# Patient Record
Sex: FEMALE | Race: BLACK OR AFRICAN AMERICAN | NOT HISPANIC OR LATINO | Employment: OTHER | URBAN - METROPOLITAN AREA
[De-identification: names, ages, dates, MRNs, and addresses within clinical notes are randomized per-mention and may not be internally consistent; named-entity substitution may affect disease eponyms.]

---

## 2017-09-30 DIAGNOSIS — H40.1133 PRIMARY OPEN ANGLE GLAUCOMA OF BOTH EYES, SEVERE STAGE: ICD-10-CM

## 2017-10-04 RX ORDER — TIMOLOL 5.12 MG/ML
SOLUTION/ DROPS OPHTHALMIC
Qty: 5 ML | Refills: 12 | Status: SHIPPED | OUTPATIENT
Start: 2017-10-04 | End: 2021-01-01

## 2017-10-04 RX ORDER — BIMATOPROST 0.1 MG/ML
1 SOLUTION/ DROPS OPHTHALMIC NIGHTLY
Qty: 2.5 ML | Refills: 12 | Status: SHIPPED | OUTPATIENT
Start: 2017-10-04 | End: 2019-01-13 | Stop reason: SDUPTHER

## 2018-12-17 ENCOUNTER — TELEPHONE (OUTPATIENT)
Dept: OPHTHALMOLOGY | Facility: CLINIC | Age: 83
End: 2018-12-17

## 2018-12-17 NOTE — TELEPHONE ENCOUNTER
[facility] lumigan and betimol. Pt has not been seen since 2016 and her drops were changed at that visit with   Dr. Ling. However, pt never returned for follow up visits. Pt will get one more fill on drops until she can come back to see Dr. Ling. Ms. Esquivel will call pt to schedule an appt

## 2019-01-13 DIAGNOSIS — H40.1133 PRIMARY OPEN ANGLE GLAUCOMA OF BOTH EYES, SEVERE STAGE: ICD-10-CM

## 2019-01-14 RX ORDER — BIMATOPROST 0.1 MG/ML
1 SOLUTION/ DROPS OPHTHALMIC NIGHTLY
Qty: 2.5 ML | Refills: 0 | Status: SHIPPED | OUTPATIENT
Start: 2019-01-14 | End: 2019-03-07 | Stop reason: SDUPTHER

## 2019-02-06 ENCOUNTER — TELEPHONE (OUTPATIENT)
Dept: PRIMARY CARE CLINIC | Facility: CLINIC | Age: 84
End: 2019-02-06

## 2019-02-06 NOTE — TELEPHONE ENCOUNTER
----- Message from Nancy Castaneda sent at 2/6/2019  1:12 PM CST -----  Name of Who is Calling: Urszula (Daughter)    What is the request in detail: Pt asking to speak to nurse about getting the pts toenails clipped      Can the clinic reply by [facility]:   No       What Number to Call Back if not in [facility]: [phone]

## 2019-02-06 NOTE — TELEPHONE ENCOUNTER
Spoke with Ms. Urszula patients daughter to explained  Unfortunately pt have to be diabetics to get there nails cut. Suggest that she take her mom to a nail salon to have them trim or cut.  And also she mention that her mom was scheduled to see  march 12, 11:00  aft

## 2019-02-14 ENCOUNTER — TELEPHONE (OUTPATIENT)
Dept: OPHTHALMOLOGY | Facility: CLINIC | Age: 84
End: 2019-02-14

## 2019-02-14 NOTE — TELEPHONE ENCOUNTER
"Spoke to pt's daughter (Cheyanne) after she and Marlene Merritt were having communication problems. I listened at length as Cheyanne talked about numerous issues from her mother, her sisters living situation and other unrelated issues. After about 10 minutes, I began to try and explain to her that Dr. Ling stated that she  was not going to be able t0  Fill out any forms since her mother has not been seen in several years. Cheyanne began to yell at me and say that I was not listening to her. I advised her that "we were trying to help her and she responded "what are we, Siamese twins, what do I mean by we" I explained that we, meaning the department of Ophthalmology was trying to help her.   Cheyanne then began to advise me that I did know anything and asked for my name and Marlene's name, which I provided. Cheyanne then told me that she was tired of playing with us and that I got my 6 months of experience and that I think I know everything, she expressed that she was a nurse and that she was reporting all of us. Cheyanne then hung up the phone. Dr. Ling advised of all of the above.   "

## 2019-02-14 NOTE — TELEPHONE ENCOUNTER
----- Message from Chanda Burris sent at 2/14/2019  3:00 PM CST -----  Contact: Cheyanne (daughter)   Needs Advice    Reason for call: pt daughter needed to speak with someone about pt eye drops. Pt daughter stated pt eyes are really red, pt is having a lot of trouble with her eyes.         Communication Preference: [phone]    Additional Information:

## 2019-02-14 NOTE — TELEPHONE ENCOUNTER
Pt's daughter called trying to get a prior authorization for her mom's Betimol drops. Pt has not been seen since 11/2016 and was prescribed new drops at her last exam. However, pt's daughter stated that her mom could not use those drops b/c she had a reaction to them. We did not know this b/c pt never returned for follow up appt. In December 2018, [facility] requested refills for drops and 1 refill was given but pt needed an appt. Pt was contacted to try and schedule appt. Pt had several appts but she no showed them. I tried to explain this to pt's daughter but she was very irate I tried to explain to her that we could not fill out any forms for her without her mom being seen. Pt's daughter continued to scream at me and would not let me finish a sentence. She began questioning my age and why I would not help her. As I tried to explain, she insulted me. At this point, I told pt that I would let her speak to another tech since she did not seem to understand me. I gave the phone to Isidro and she has documented her own conversation with pt's daughter. Cheyanne continued to be irate and would not let Isidro explain the situation to her either and finally hung up on her. Finally, I called the pt back and had Dr. Ling talk to her and explain things.   Dr. Ling explained that the medication that was at [facility] was filled back in December with the condition that pt come in for follow up. They never picked it up b/c the co-pay was too expensive.  said that she could not fill out any forms without seeing the pt since it has been over a year. Dr. Ovalle stated that she could possibly change the Betimol to another drop that would be covered but she would have to see the pt first. Dr. Ling offered to see pt ASAP but pt's daughter only wanted the drops get authorized at a lower co-pay. We have not received any forms for PA and pt's daughter did not schedule an appt.

## 2019-02-14 NOTE — TELEPHONE ENCOUNTER
----- Message from Chanda Burris sent at 2/14/2019  3:00 PM CST -----  Contact: Cheyanne (daughter)   Needs Advice    Reason for call: pt daughter needed to speak with someone about pt eye drops. Pt daughter stated pt eyes are really red, pt is having a lot of trouble with her eyes.         Communication Preference: [phone]    Additional Information:

## 2019-03-06 NOTE — PROGRESS NOTES
Assessment /Plan     For exam results, see Encounter Report.    Primary open angle glaucoma of both eyes, severe stage  -     Discontinue: bimatoprost (LUMIGAN) 0.01 % Drop; Place 1 drop into both eyes every evening.  Dispense: 2.5 mL; Refill: 0  -     Discontinue: dorzolamide-timolol 2-0.5% (COSOPT) 22.3-6.8 mg/mL ophthalmic solution; Place 1 drop into both eyes 3 (three) times daily.  Dispense: 10 mL; Refill: 12  -     dorzolamide-timolol 2-0.5% (COSOPT) 22.3-6.8 mg/mL ophthalmic solution; Place 1 drop into both eyes 3 (three) times daily.  Dispense: 10 mL; Refill: 12  -     bimatoprost (LUMIGAN) 0.01 % Drop; Place 1 drop into both eyes every evening.  Dispense: 2.5 mL; Refill: 12    Glaucoma filtering bleb of both eyes    Pseudophakia of both eyes    PCO (posterior capsular opacification), bilateral    Blind in both eyes            NEW PATIENT to [facility] 7/2014    3/7/2019  Pt is here with a daughter and a grand daughter - lives with the daughter   Pt has another daughter in [place] - who called about a month ago - wanting us to re-order her eye drops and to fill out an authorization form for betimol (it is not covered by her insurance). I tried to explain that since I had not seen her in > 2 years - that I did not feel comfortable filling out authorization forms and prescriptions .  So I sent in Rx for 1 bottle each with no refills and told her I needed to see the patient. That is why patient is here today.        Glaucoma (type and duration)    X decades - old pt Dr. Dupree at [facility] - post Jocelyne in [place]   First HVF   First [facility] - 2015   First photos   First [facility] photos 2014    Treatment / Drops started   On Rx for decades            Family history    ?        Glaucoma meds    Betimol ou / lumigan ou (also S/P trabs ou)        H/O adverse rxn to glaucoma drops    ?        LASERS   yag cap od - 11/17/2016           GLAUCOMA SURGERIES    Trabs ou - Dr. Dupree at [facility]         OTHER EYE SURGERIES    PC IOL ou         CDR    0.9 ou         Tbase    ? Off gtts and pre - trabs          Tmax    ??            Ttarget    15 ou             HVF    1 test 2014 to  2014 - 24-2 ss od  - SAD / IAD // 24-2 stim V os - SAD / IAD         Gonio    +2-3 ou         CCT   542/550        OCT    1 test 2014 to 2014 - RNFL - dec. S/T/I  od // dec. S/T/I os        HRT    ? test 20? to 20? - MR - ? od // ? os        Disc photos    2014 - OIS     - Ttoday    22/29 - (? If using gtts - 2.5 yrs since last visit))   - Test done today   IOP // re-establish care // order eye drops     2) PCO ou   Mild monitor   BCVA 20/40 od and 20/200 os (2/2 adv. Glaucoma)    3) pseudophakia ou  - stable      May need to change to 10-2 in future    Pt admits to poor compliance with gtt's  Emphasized imprtance of compliance with gtts   Cont / re-start  lumigan in both eyes  Cont / re-start cosopt - dorzolamide / timolol OU - use tid - told them that it is normal to burn / sting for 1-5 minutes after installation     Ok to use AT's pre - and post cosopt eye drops (wait at least 5 minutues)     Stop Betimol and replace it with gen cosopt     VA  LOSS OS 2/2 END STAGE GLAUCOMA     Pt is Legally blind   VF testing is within 10 degrees of fixation od and VA 20/400->HM os - decreased vision likely due to Snuff out OS     Have attempted to sen pt to [facility] in past - ?? If she ever went     F/U 1 months with DFE / OCT - doubt pt can do HVF any more    If IOP too high consider alphagan P trial (likes name brands when possible) or brimonidine    If remains too high consider G6 laser od     I have seen and personally examined the patient.  I agree with the findings, assessment and plan of the resident and/or fellow.     Charleen Ling MD

## 2019-03-07 ENCOUNTER — OFFICE VISIT (OUTPATIENT)
Dept: OPHTHALMOLOGY | Facility: CLINIC | Age: 84
End: 2019-03-07
Payer: MEDICARE

## 2019-03-07 DIAGNOSIS — Z98.83 GLAUCOMA FILTERING BLEB OF BOTH EYES: ICD-10-CM

## 2019-03-07 DIAGNOSIS — Z96.1 PSEUDOPHAKIA OF BOTH EYES: ICD-10-CM

## 2019-03-07 DIAGNOSIS — H40.1133 PRIMARY OPEN ANGLE GLAUCOMA OF BOTH EYES, SEVERE STAGE: Primary | ICD-10-CM

## 2019-03-07 DIAGNOSIS — H54.3 BLIND IN BOTH EYES: ICD-10-CM

## 2019-03-07 DIAGNOSIS — H26.493 PCO (POSTERIOR CAPSULAR OPACIFICATION), BILATERAL: ICD-10-CM

## 2019-03-07 PROCEDURE — 92012 INTRM OPH EXAM EST PATIENT: CPT | Mod: S$PBB,,, | Performed by: OPHTHALMOLOGY

## 2019-03-07 PROCEDURE — 99999 PR PBB SHADOW E&M-EST. PATIENT-LVL II: CPT | Mod: PBBFAC,,, | Performed by: OPHTHALMOLOGY

## 2019-03-07 PROCEDURE — 92012 PR EYE EXAM, EST PATIENT,INTERMED: ICD-10-PCS | Mod: S$PBB,,, | Performed by: OPHTHALMOLOGY

## 2019-03-07 PROCEDURE — 99212 OFFICE O/P EST SF 10 MIN: CPT | Mod: PBBFAC | Performed by: OPHTHALMOLOGY

## 2019-03-07 PROCEDURE — 99999 PR PBB SHADOW E&M-EST. PATIENT-LVL II: ICD-10-PCS | Mod: PBBFAC,,, | Performed by: OPHTHALMOLOGY

## 2019-03-07 RX ORDER — DORZOLAMIDE HYDROCHLORIDE AND TIMOLOL MALEATE 20; 5 MG/ML; MG/ML
1 SOLUTION/ DROPS OPHTHALMIC 3 TIMES DAILY
Qty: 10 ML | Refills: 12 | Status: SHIPPED | OUTPATIENT
Start: 2019-03-07 | End: 2019-03-07 | Stop reason: SDUPTHER

## 2019-03-07 RX ORDER — DORZOLAMIDE HYDROCHLORIDE AND TIMOLOL MALEATE 20; 5 MG/ML; MG/ML
1 SOLUTION/ DROPS OPHTHALMIC 3 TIMES DAILY
Qty: 10 ML | Refills: 12 | Status: SHIPPED | OUTPATIENT
Start: 2019-03-07 | End: 2019-08-20 | Stop reason: ALTCHOICE

## 2019-03-08 ENCOUNTER — TELEPHONE (OUTPATIENT)
Dept: PODIATRY | Facility: CLINIC | Age: 84
End: 2019-03-08

## 2019-03-08 NOTE — TELEPHONE ENCOUNTER
I have attempted to contact this patient by phone to remained patient of appointment scheduled for 3-12-19.

## 2019-08-06 RX ORDER — TIMOLOL 5.12 MG/ML
SOLUTION/ DROPS OPHTHALMIC
Qty: 5 ML | Refills: 12 | OUTPATIENT
Start: 2019-08-06

## 2019-08-20 ENCOUNTER — TELEPHONE (OUTPATIENT)
Dept: OPHTHALMOLOGY | Facility: CLINIC | Age: 84
End: 2019-08-20

## 2019-08-20 DIAGNOSIS — H40.1133 PRIMARY OPEN ANGLE GLAUCOMA (POAG) OF BOTH EYES, SEVERE STAGE: Primary | ICD-10-CM

## 2019-08-20 RX ORDER — BRIMONIDINE TARTRATE AND TIMOLOL MALEATE 2; 5 MG/ML; MG/ML
1 SOLUTION OPHTHALMIC 2 TIMES DAILY
COMMUNITY
End: 2019-08-20 | Stop reason: SDUPTHER

## 2019-08-20 RX ORDER — BRIMONIDINE TARTRATE AND TIMOLOL MALEATE 2; 5 MG/ML; MG/ML
1 SOLUTION OPHTHALMIC 2 TIMES DAILY
Qty: 5 ML | Refills: 6 | Status: SHIPPED | OUTPATIENT
Start: 2019-08-20 | End: 2020-04-03

## 2019-08-20 NOTE — TELEPHONE ENCOUNTER
Pt's daughter, Cheyanne, states that pt c/o Cosopt burning eyes too much to the point that pt refuses to have eye drops put in her eyes. Pt was seen in March and came in with her daughter, Urszula, who lives here in [place]. Dr. Ling discontinued pt's Betimol and put pt on generic Cosopt. Dr. Ling explained to them that the drop will burn/sting for a few minutes but that compliance was very important. Pt is now in [place] for a bit with her other daughter.  I tried explaining to Cheyanne why  switched drops and why she was taken off of Betimol. Pt's daughter began yelling and getting very aggressive. I have had a previous incident involving this daughter. I told pt that I will not continue to have a discussion with her and will have Dr. Ling call her back. I have called pt's daughter Urszula to see if pt was having any problems with drops since starting them and explained what was going on. They said that pt had a little burning sensation but it was not too bad. Dr. Ling spoke to the daughter in [place] and will try switching pt to Combigan. Rx sent to pharmacy.

## 2019-08-20 NOTE — TELEPHONE ENCOUNTER
----- Message from Estella Baxter sent at 8/20/2019 10:26 AM CDT -----  Contact: Daughter  Daughter is calling because the medication she uses causes eye burning; Bitimole.  She is asked Staff return the call to discuss and alternative.    She can be reached at [phone].    Thank you.

## 2019-08-27 DIAGNOSIS — H40.1133 PRIMARY OPEN ANGLE GLAUCOMA OF BOTH EYES, SEVERE STAGE: ICD-10-CM

## 2019-10-07 ENCOUNTER — TELEPHONE (OUTPATIENT)
Dept: OPHTHALMOLOGY | Facility: CLINIC | Age: 84
End: 2019-10-07

## 2020-03-15 DIAGNOSIS — H40.1133 PRIMARY OPEN ANGLE GLAUCOMA OF BOTH EYES, SEVERE STAGE: ICD-10-CM

## 2020-03-16 RX ORDER — BIMATOPROST 0.1 MG/ML
SOLUTION/ DROPS OPHTHALMIC
Qty: 7.5 ML | Refills: 3 | Status: SHIPPED | OUTPATIENT
Start: 2020-03-16 | End: 2020-12-10

## 2020-04-03 DIAGNOSIS — H40.1133 PRIMARY OPEN ANGLE GLAUCOMA (POAG) OF BOTH EYES, SEVERE STAGE: ICD-10-CM

## 2020-04-03 RX ORDER — BRIMONIDINE TARTRATE, TIMOLOL MALEATE 2; 5 MG/ML; MG/ML
SOLUTION/ DROPS OPHTHALMIC
Qty: 5 ML | Refills: 6 | Status: SHIPPED | OUTPATIENT
Start: 2020-04-03 | End: 2020-12-17

## 2020-10-01 ENCOUNTER — PATIENT MESSAGE (OUTPATIENT)
Dept: OTHER | Facility: OTHER | Age: 85
End: 2020-10-01

## 2020-12-11 ENCOUNTER — PATIENT MESSAGE (OUTPATIENT)
Dept: OTHER | Facility: OTHER | Age: 85
End: 2020-12-11

## 2021-01-01 ENCOUNTER — TELEPHONE (OUTPATIENT)
Dept: PRIMARY CARE CLINIC | Facility: CLINIC | Age: 86
End: 2021-01-01

## 2021-01-01 ENCOUNTER — CARE AT HOME (OUTPATIENT)
Dept: HOME HEALTH SERVICES | Facility: CLINIC | Age: 86
End: 2021-01-01
Payer: MEDICARE

## 2021-01-01 ENCOUNTER — EXTERNAL HOME HEALTH (OUTPATIENT)
Dept: HOME HEALTH SERVICES | Facility: HOSPITAL | Age: 86
End: 2021-01-01
Payer: MEDICARE

## 2021-01-01 ENCOUNTER — TELEPHONE (OUTPATIENT)
Dept: EMERGENCY MEDICINE | Facility: OTHER | Age: 86
End: 2021-01-01

## 2021-01-01 ENCOUNTER — TELEPHONE (OUTPATIENT)
Dept: PRIMARY CARE CLINIC | Facility: CLINIC | Age: 86
End: 2021-01-01
Payer: MEDICARE

## 2021-01-01 ENCOUNTER — OFFICE VISIT (OUTPATIENT)
Dept: INTERNAL MEDICINE | Facility: CLINIC | Age: 86
End: 2021-01-01
Payer: MEDICARE

## 2021-01-01 ENCOUNTER — OFFICE VISIT (OUTPATIENT)
Dept: PRIMARY CARE CLINIC | Facility: CLINIC | Age: 86
End: 2021-01-01
Payer: MEDICARE

## 2021-01-01 ENCOUNTER — HOSPITAL ENCOUNTER (OUTPATIENT)
Facility: OTHER | Age: 86
Discharge: HOME | End: 2021-08-29
Attending: EMERGENCY MEDICINE | Admitting: EMERGENCY MEDICINE
Payer: MEDICARE

## 2021-01-01 ENCOUNTER — TELEPHONE (OUTPATIENT)
Dept: INTERNAL MEDICINE | Facility: CLINIC | Age: 86
End: 2021-01-01

## 2021-01-01 ENCOUNTER — PATIENT MESSAGE (OUTPATIENT)
Dept: RESEARCH | Facility: HOSPITAL | Age: 86
End: 2021-01-01

## 2021-01-01 ENCOUNTER — OUTPATIENT CASE MANAGEMENT (OUTPATIENT)
Dept: ADMINISTRATIVE | Facility: OTHER | Age: 86
End: 2021-01-01

## 2021-01-01 ENCOUNTER — LAB VISIT (OUTPATIENT)
Dept: LAB | Facility: HOSPITAL | Age: 86
End: 2021-01-01
Attending: INTERNAL MEDICINE
Payer: MEDICARE

## 2021-01-01 ENCOUNTER — IMMUNIZATION (OUTPATIENT)
Dept: PRIMARY CARE CLINIC | Facility: CLINIC | Age: 86
End: 2021-01-01
Attending: EMERGENCY MEDICINE

## 2021-01-01 ENCOUNTER — HOSPITAL ENCOUNTER (EMERGENCY)
Facility: OTHER | Age: 86
Discharge: HOME | End: 2021-08-04
Attending: EMERGENCY MEDICINE
Payer: MEDICARE

## 2021-01-01 ENCOUNTER — TELEPHONE (OUTPATIENT)
Dept: ADMINISTRATIVE | Facility: OTHER | Age: 86
End: 2021-01-01

## 2021-01-01 ENCOUNTER — HOSPITAL ENCOUNTER (OUTPATIENT)
Dept: RADIOLOGY | Facility: HOSPITAL | Age: 86
Discharge: HOME | End: 2021-11-30
Attending: INTERNAL MEDICINE
Payer: MEDICARE

## 2021-01-01 VITALS
RESPIRATION RATE: 16 BRPM | OXYGEN SATURATION: 95 % | TEMPERATURE: 98 F | SYSTOLIC BLOOD PRESSURE: 160 MMHG | HEART RATE: 59 BPM | DIASTOLIC BLOOD PRESSURE: 84 MMHG

## 2021-01-01 VITALS
OXYGEN SATURATION: 96 % | DIASTOLIC BLOOD PRESSURE: 90 MMHG | RESPIRATION RATE: 18 BRPM | HEART RATE: 69 BPM | SYSTOLIC BLOOD PRESSURE: 140 MMHG | TEMPERATURE: 98 F

## 2021-01-01 VITALS
BODY MASS INDEX: 23.57 KG/M2 | HEIGHT: 68 IN | SYSTOLIC BLOOD PRESSURE: 132 MMHG | OXYGEN SATURATION: 99 % | TEMPERATURE: 98 F | HEART RATE: 62 BPM | DIASTOLIC BLOOD PRESSURE: 80 MMHG

## 2021-01-01 VITALS
BODY MASS INDEX: 13.79 KG/M2 | HEIGHT: 68 IN | SYSTOLIC BLOOD PRESSURE: 128 MMHG | DIASTOLIC BLOOD PRESSURE: 86 MMHG | HEART RATE: 82 BPM | TEMPERATURE: 98 F | OXYGEN SATURATION: 99 %

## 2021-01-01 VITALS
OXYGEN SATURATION: 100 % | TEMPERATURE: 98 F | HEART RATE: 66 BPM | RESPIRATION RATE: 31 BRPM | DIASTOLIC BLOOD PRESSURE: 77 MMHG | SYSTOLIC BLOOD PRESSURE: 154 MMHG

## 2021-01-01 VITALS
BODY MASS INDEX: 23.57 KG/M2 | SYSTOLIC BLOOD PRESSURE: 112 MMHG | HEIGHT: 68 IN | OXYGEN SATURATION: 99 % | HEART RATE: 62 BPM | DIASTOLIC BLOOD PRESSURE: 70 MMHG

## 2021-01-01 VITALS
WEIGHT: 90.69 LBS | BODY MASS INDEX: 13.75 KG/M2 | DIASTOLIC BLOOD PRESSURE: 93 MMHG | OXYGEN SATURATION: 98 % | TEMPERATURE: 98 F | HEIGHT: 68 IN | HEART RATE: 76 BPM | RESPIRATION RATE: 17 BRPM | SYSTOLIC BLOOD PRESSURE: 179 MMHG

## 2021-01-01 VITALS
TEMPERATURE: 99 F | HEART RATE: 71 BPM | DIASTOLIC BLOOD PRESSURE: 98 MMHG | SYSTOLIC BLOOD PRESSURE: 162 MMHG | HEIGHT: 68 IN | OXYGEN SATURATION: 97 % | BODY MASS INDEX: 13.79 KG/M2

## 2021-01-01 DIAGNOSIS — H40.1133 PRIMARY OPEN ANGLE GLAUCOMA OF BOTH EYES, SEVERE STAGE: ICD-10-CM

## 2021-01-01 DIAGNOSIS — T76.01XA: ICD-10-CM

## 2021-01-01 DIAGNOSIS — H40.1133 PRIMARY OPEN ANGLE GLAUCOMA (POAG) OF BOTH EYES, SEVERE STAGE: ICD-10-CM

## 2021-01-01 DIAGNOSIS — R10.30 LOWER ABDOMINAL PAIN: Primary | ICD-10-CM

## 2021-01-01 DIAGNOSIS — M24.569 CONTRACTURE OF LOWER LEG JOINT: ICD-10-CM

## 2021-01-01 DIAGNOSIS — Z74.01 BEDBOUND: ICD-10-CM

## 2021-01-01 DIAGNOSIS — R41.3 MEMORY DEFICIT: ICD-10-CM

## 2021-01-01 DIAGNOSIS — R41.82 AMS (ALTERED MENTAL STATUS): ICD-10-CM

## 2021-01-01 DIAGNOSIS — L89.220: ICD-10-CM

## 2021-01-01 DIAGNOSIS — K59.01 SLOW TRANSIT CONSTIPATION: ICD-10-CM

## 2021-01-01 DIAGNOSIS — E86.0 DEHYDRATION: ICD-10-CM

## 2021-01-01 DIAGNOSIS — H54.3 IMPAIRED VISION IN BOTH EYES: ICD-10-CM

## 2021-01-01 DIAGNOSIS — Z23 FLU VACCINE NEED: ICD-10-CM

## 2021-01-01 DIAGNOSIS — L89.152 PRESSURE INJURY OF SACRAL REGION, STAGE 2: ICD-10-CM

## 2021-01-01 DIAGNOSIS — F44.4 FUNCTIONAL PARAPARESIS: Primary | ICD-10-CM

## 2021-01-01 DIAGNOSIS — K59.00 CONSTIPATION, UNSPECIFIED CONSTIPATION TYPE: ICD-10-CM

## 2021-01-01 DIAGNOSIS — I10 ESSENTIAL HYPERTENSION: ICD-10-CM

## 2021-01-01 DIAGNOSIS — N30.01 ACUTE CYSTITIS WITH HEMATURIA: ICD-10-CM

## 2021-01-01 DIAGNOSIS — Z00.00 HEALTHCARE MAINTENANCE: ICD-10-CM

## 2021-01-01 DIAGNOSIS — R64 CACHECTIC: ICD-10-CM

## 2021-01-01 DIAGNOSIS — M62.421 CONTRACTURE OF MUSCLE OF BOTH UPPER ARMS: ICD-10-CM

## 2021-01-01 DIAGNOSIS — E77.8 HYPOPROTEINEMIA: ICD-10-CM

## 2021-01-01 DIAGNOSIS — M24.561 CONTRACTURES OF BOTH KNEES: ICD-10-CM

## 2021-01-01 DIAGNOSIS — R53.1 GENERALIZED WEAKNESS: ICD-10-CM

## 2021-01-01 DIAGNOSIS — F03.90 DEMENTIA WITHOUT BEHAVIORAL DISTURBANCE, UNSPECIFIED DEMENTIA TYPE: ICD-10-CM

## 2021-01-01 DIAGNOSIS — I25.118 CORONARY ARTERY DISEASE OF NATIVE ARTERY OF NATIVE HEART WITH STABLE ANGINA PECTORIS: ICD-10-CM

## 2021-01-01 DIAGNOSIS — F44.4 FUNCTIONAL PARAPARESIS: ICD-10-CM

## 2021-01-01 DIAGNOSIS — R53.81 DEBILITY: Primary | ICD-10-CM

## 2021-01-01 DIAGNOSIS — R62.7 FAILURE TO THRIVE IN ADULT: Primary | ICD-10-CM

## 2021-01-01 DIAGNOSIS — I10 PRIMARY HYPERTENSION: ICD-10-CM

## 2021-01-01 DIAGNOSIS — Z51.5 PALLIATIVE CARE ENCOUNTER: Primary | ICD-10-CM

## 2021-01-01 DIAGNOSIS — R73.9 HYPERGLYCEMIA: ICD-10-CM

## 2021-01-01 DIAGNOSIS — R80.9 PROTEINURIA, UNSPECIFIED TYPE: ICD-10-CM

## 2021-01-01 DIAGNOSIS — R54 AGE-RELATED PHYSICAL DEBILITY: ICD-10-CM

## 2021-01-01 DIAGNOSIS — M25.529 ELBOW PAIN: ICD-10-CM

## 2021-01-01 DIAGNOSIS — R53.81 PHYSICAL DECONDITIONING: ICD-10-CM

## 2021-01-01 DIAGNOSIS — M62.422 CONTRACTURE OF MUSCLE OF BOTH UPPER ARMS: ICD-10-CM

## 2021-01-01 DIAGNOSIS — R41.82 ALTERED MENTAL STATUS, UNSPECIFIED ALTERED MENTAL STATUS TYPE: Primary | ICD-10-CM

## 2021-01-01 DIAGNOSIS — Z71.89 COUNSELING REGARDING ADVANCE CARE PLANNING AND GOALS OF CARE: ICD-10-CM

## 2021-01-01 DIAGNOSIS — R53.81 PHYSICAL DEBILITY: ICD-10-CM

## 2021-01-01 DIAGNOSIS — K59.00 CONSTIPATION, UNSPECIFIED CONSTIPATION TYPE: Primary | ICD-10-CM

## 2021-01-01 DIAGNOSIS — Z23 NEED FOR VACCINATION: Primary | ICD-10-CM

## 2021-01-01 DIAGNOSIS — R55 SYNCOPE: ICD-10-CM

## 2021-01-01 DIAGNOSIS — R64 CACHEXIA: ICD-10-CM

## 2021-01-01 DIAGNOSIS — E43 SEVERE PROTEIN-ENERGY MALNUTRITION: ICD-10-CM

## 2021-01-01 DIAGNOSIS — R55 SYNCOPE, UNSPECIFIED SYNCOPE TYPE: ICD-10-CM

## 2021-01-01 DIAGNOSIS — M62.461 CONTRACTURE OF MUSCLES OF BOTH LOWER EXTREMITIES: ICD-10-CM

## 2021-01-01 DIAGNOSIS — M62.462 CONTRACTURE OF MUSCLES OF BOTH LOWER EXTREMITIES: ICD-10-CM

## 2021-01-01 DIAGNOSIS — R31.9 HEMATURIA, UNSPECIFIED TYPE: ICD-10-CM

## 2021-01-01 DIAGNOSIS — R64 CACHEXIA: Primary | ICD-10-CM

## 2021-01-01 DIAGNOSIS — R52 PAIN: ICD-10-CM

## 2021-01-01 DIAGNOSIS — I77.1 TORTUOUS AORTA: ICD-10-CM

## 2021-01-01 DIAGNOSIS — M24.562 CONTRACTURES OF BOTH KNEES: ICD-10-CM

## 2021-01-01 LAB
ALBUMIN SERPL BCP-MCNC: 3 G/DL (ref 3.5–5.2)
ALBUMIN SERPL BCP-MCNC: 3 G/DL (ref 3.5–5.2)
ALP SERPL-CCNC: 75 U/L (ref 55–135)
ALP SERPL-CCNC: 90 U/L (ref 55–135)
ALT SERPL W/O P-5'-P-CCNC: 16 U/L (ref 10–44)
ALT SERPL W/O P-5'-P-CCNC: 19 U/L (ref 10–44)
ANION GAP SERPL CALC-SCNC: 11 MMOL/L (ref 8–16)
ANION GAP SERPL CALC-SCNC: 12 MMOL/L (ref 8–16)
ANION GAP SERPL CALC-SCNC: 7 MMOL/L (ref 8–16)
AST SERPL-CCNC: 24 U/L (ref 10–40)
AST SERPL-CCNC: 34 U/L (ref 10–40)
BACTERIA #/AREA URNS HPF: ABNORMAL /HPF
BACTERIA BLD CULT: NORMAL
BACTERIA BLD CULT: NORMAL
BASOPHILS # BLD AUTO: 0.03 K/UL (ref 0–0.2)
BASOPHILS # BLD AUTO: 0.04 K/UL (ref 0–0.2)
BASOPHILS NFR BLD: 0.5 % (ref 0–1.9)
BASOPHILS NFR BLD: 0.9 % (ref 0–1.9)
BILIRUB SERPL-MCNC: 0.3 MG/DL (ref 0.1–1)
BILIRUB SERPL-MCNC: 0.4 MG/DL (ref 0.1–1)
BILIRUB UR QL STRIP: NEGATIVE
BILIRUB UR QL STRIP: NEGATIVE
BNP SERPL-MCNC: 156 PG/ML (ref 0–99)
BUN SERPL-MCNC: 16 MG/DL (ref 8–23)
BUN SERPL-MCNC: 19 MG/DL (ref 8–23)
BUN SERPL-MCNC: 20 MG/DL (ref 8–23)
CALCIUM SERPL-MCNC: 10 MG/DL (ref 8.7–10.5)
CALCIUM SERPL-MCNC: 9.5 MG/DL (ref 8.7–10.5)
CALCIUM SERPL-MCNC: 9.5 MG/DL (ref 8.7–10.5)
CHLORIDE SERPL-SCNC: 103 MMOL/L (ref 95–110)
CHLORIDE SERPL-SCNC: 104 MMOL/L (ref 95–110)
CHLORIDE SERPL-SCNC: 104 MMOL/L (ref 95–110)
CHOLEST SERPL-MCNC: 151 MG/DL (ref 120–199)
CHOLEST/HDLC SERPL: 4 {RATIO} (ref 2–5)
CLARITY UR: ABNORMAL
CLARITY UR: CLEAR
CO2 SERPL-SCNC: 21 MMOL/L (ref 23–29)
CO2 SERPL-SCNC: 22 MMOL/L (ref 23–29)
CO2 SERPL-SCNC: 27 MMOL/L (ref 23–29)
COLOR UR: YELLOW
COLOR UR: YELLOW
CREAT SERPL-MCNC: 0.7 MG/DL (ref 0.5–1.4)
CREAT SERPL-MCNC: 0.7 MG/DL (ref 0.5–1.4)
CREAT SERPL-MCNC: 0.8 MG/DL (ref 0.5–1.4)
CTP QC/QA: YES
CTP QC/QA: YES
DIFFERENTIAL METHOD: ABNORMAL
DIFFERENTIAL METHOD: ABNORMAL
EOSINOPHIL # BLD AUTO: 0.1 K/UL (ref 0–0.5)
EOSINOPHIL # BLD AUTO: 0.1 K/UL (ref 0–0.5)
EOSINOPHIL NFR BLD: 1.7 % (ref 0–8)
EOSINOPHIL NFR BLD: 1.9 % (ref 0–8)
ERYTHROCYTE [DISTWIDTH] IN BLOOD BY AUTOMATED COUNT: 13.9 % (ref 11.5–14.5)
ERYTHROCYTE [DISTWIDTH] IN BLOOD BY AUTOMATED COUNT: 14.6 % (ref 11.5–14.5)
EST. GFR  (AFRICAN AMERICAN): >60 ML/MIN/1.73 M^2
EST. GFR  (NON AFRICAN AMERICAN): >60 ML/MIN/1.73 M^2
FOLATE SERPL-MCNC: 15.9 NG/ML (ref 4–24)
GLUCOSE SERPL-MCNC: 108 MG/DL (ref 70–110)
GLUCOSE SERPL-MCNC: 204 MG/DL (ref 70–110)
GLUCOSE SERPL-MCNC: 73 MG/DL (ref 70–110)
GLUCOSE UR QL STRIP: NEGATIVE
GLUCOSE UR QL STRIP: NEGATIVE
HCT VFR BLD AUTO: 40.8 % (ref 37–48.5)
HCT VFR BLD AUTO: 41.6 % (ref 37–48.5)
HDLC SERPL-MCNC: 38 MG/DL (ref 40–75)
HDLC SERPL: 25.2 % (ref 20–50)
HGB BLD-MCNC: 12.6 G/DL (ref 12–16)
HGB BLD-MCNC: 13.3 G/DL (ref 12–16)
HGB UR QL STRIP: ABNORMAL
HGB UR QL STRIP: NEGATIVE
HYALINE CASTS #/AREA URNS LPF: 0 /LPF
IMM GRANULOCYTES # BLD AUTO: 0.01 K/UL (ref 0–0.04)
IMM GRANULOCYTES # BLD AUTO: 0.02 K/UL (ref 0–0.04)
IMM GRANULOCYTES NFR BLD AUTO: 0.2 % (ref 0–0.5)
IMM GRANULOCYTES NFR BLD AUTO: 0.3 % (ref 0–0.5)
KETONES UR QL STRIP: ABNORMAL
KETONES UR QL STRIP: NEGATIVE
LACTATE SERPL-SCNC: 0.9 MMOL/L (ref 0.5–2.2)
LDLC SERPL CALC-MCNC: 91 MG/DL (ref 63–159)
LEUKOCYTE ESTERASE UR QL STRIP: ABNORMAL
LEUKOCYTE ESTERASE UR QL STRIP: NEGATIVE
LYMPHOCYTES # BLD AUTO: 0.7 K/UL (ref 1–4.8)
LYMPHOCYTES # BLD AUTO: 1.2 K/UL (ref 1–4.8)
LYMPHOCYTES NFR BLD: 11.5 % (ref 18–48)
LYMPHOCYTES NFR BLD: 27 % (ref 18–48)
MAGNESIUM SERPL-MCNC: 1.8 MG/DL (ref 1.6–2.6)
MAGNESIUM SERPL-MCNC: 1.9 MG/DL (ref 1.6–2.6)
MCH RBC QN AUTO: 29.2 PG (ref 27–31)
MCH RBC QN AUTO: 29.5 PG (ref 27–31)
MCHC RBC AUTO-ENTMCNC: 30.9 G/DL (ref 32–36)
MCHC RBC AUTO-ENTMCNC: 32 G/DL (ref 32–36)
MCV RBC AUTO: 92 FL (ref 82–98)
MCV RBC AUTO: 95 FL (ref 82–98)
MICROSCOPIC COMMENT: ABNORMAL
MONOCYTES # BLD AUTO: 0.3 K/UL (ref 0.3–1)
MONOCYTES # BLD AUTO: 0.4 K/UL (ref 0.3–1)
MONOCYTES NFR BLD: 6 % (ref 4–15)
MONOCYTES NFR BLD: 6.4 % (ref 4–15)
NEUTROPHILS # BLD AUTO: 2.8 K/UL (ref 1.8–7.7)
NEUTROPHILS # BLD AUTO: 4.6 K/UL (ref 1.8–7.7)
NEUTROPHILS NFR BLD: 64 % (ref 38–73)
NEUTROPHILS NFR BLD: 79.6 % (ref 38–73)
NITRITE UR QL STRIP: NEGATIVE
NITRITE UR QL STRIP: POSITIVE
NONHDLC SERPL-MCNC: 113 MG/DL
NRBC BLD-RTO: 0 /100 WBC
NRBC BLD-RTO: 0 /100 WBC
PH UR STRIP: 7 [PH] (ref 5–8)
PH UR STRIP: 8 [PH] (ref 5–8)
PHOSPHATE SERPL-MCNC: 3.2 MG/DL (ref 2.7–4.5)
PLATELET # BLD AUTO: 252 K/UL (ref 150–450)
PLATELET # BLD AUTO: 275 K/UL (ref 150–450)
PMV BLD AUTO: 9.6 FL (ref 9.2–12.9)
PMV BLD AUTO: 9.8 FL (ref 9.2–12.9)
POCT GLUCOSE: 162 MG/DL (ref 70–110)
POTASSIUM SERPL-SCNC: 3.9 MMOL/L (ref 3.5–5.1)
POTASSIUM SERPL-SCNC: 4.4 MMOL/L (ref 3.5–5.1)
POTASSIUM SERPL-SCNC: 5.4 MMOL/L (ref 3.5–5.1)
PROT SERPL-MCNC: 8.1 G/DL (ref 6–8.4)
PROT SERPL-MCNC: 8.5 G/DL (ref 6–8.4)
PROT UR QL STRIP: ABNORMAL
PROT UR QL STRIP: NEGATIVE
RBC # BLD AUTO: 4.31 M/UL (ref 4–5.4)
RBC # BLD AUTO: 4.51 M/UL (ref 4–5.4)
RBC #/AREA URNS HPF: 5 /HPF (ref 0–4)
RPR SER QL: NORMAL
SARS-COV-2 RDRP RESP QL NAA+PROBE: NEGATIVE
SARS-COV-2 RDRP RESP QL NAA+PROBE: NEGATIVE
SODIUM SERPL-SCNC: 136 MMOL/L (ref 136–145)
SODIUM SERPL-SCNC: 137 MMOL/L (ref 136–145)
SODIUM SERPL-SCNC: 138 MMOL/L (ref 136–145)
SP GR UR STRIP: 1.02 (ref 1–1.03)
SP GR UR STRIP: 1.02 (ref 1–1.03)
SQUAMOUS #/AREA URNS HPF: 3 /HPF
TRIGL SERPL-MCNC: 110 MG/DL (ref 30–150)
TROPONIN I SERPL DL<=0.01 NG/ML-MCNC: 0.01 NG/ML (ref 0–0.03)
TSH SERPL DL<=0.005 MIU/L-ACNC: 1.62 UIU/ML (ref 0.4–4)
URN SPEC COLLECT METH UR: ABNORMAL
URN SPEC COLLECT METH UR: NORMAL
UROBILINOGEN UR STRIP-ACNC: NEGATIVE EU/DL
UROBILINOGEN UR STRIP-ACNC: NEGATIVE EU/DL
VIT B12 SERPL-MCNC: 1132 PG/ML (ref 210–950)
WBC # BLD AUTO: 4.3 K/UL (ref 3.9–12.7)
WBC # BLD AUTO: 5.82 K/UL (ref 3.9–12.7)
WBC #/AREA URNS HPF: >100 /HPF (ref 0–5)

## 2021-01-01 PROCEDURE — 84484 ASSAY OF TROPONIN QUANT: CPT | Performed by: EMERGENCY MEDICINE

## 2021-01-01 PROCEDURE — 93005 ELECTROCARDIOGRAM TRACING: CPT

## 2021-01-01 PROCEDURE — 93010 ELECTROCARDIOGRAM REPORT: CPT | Mod: ,,, | Performed by: INTERNAL MEDICINE

## 2021-01-01 PROCEDURE — 74018 RADEX ABDOMEN 1 VIEW: CPT | Mod: 26,,, | Performed by: RADIOLOGY

## 2021-01-01 PROCEDURE — 99217 PR OBSERVATION CARE DISCHARGE: CPT | Mod: ,,, | Performed by: HOSPITALIST

## 2021-01-01 PROCEDURE — 80048 BASIC METABOLIC PNL TOTAL CA: CPT | Performed by: HOSPITALIST

## 2021-01-01 PROCEDURE — 99497 ADVNCD CARE PLAN 30 MIN: CPT | Mod: S$GLB,,, | Performed by: NURSE PRACTITIONER

## 2021-01-01 PROCEDURE — 87040 BLOOD CULTURE FOR BACTERIA: CPT | Mod: 59 | Performed by: PHYSICIAN ASSISTANT

## 2021-01-01 PROCEDURE — 99214 PR OFFICE/OUTPT VISIT, EST, LEVL IV, 30-39 MIN: ICD-10-PCS | Mod: 95,,, | Performed by: INTERNAL MEDICINE

## 2021-01-01 PROCEDURE — 91300 COVID-19, MRNA, LNP-S, PF, 30 MCG/0.3 ML DOSE VACCINE: CPT | Mod: S$GLB,,, | Performed by: EMERGENCY MEDICINE

## 2021-01-01 PROCEDURE — 99350 PR HOME VISIT,ESTAB PATIENT,LEVEL IV: ICD-10-PCS | Mod: S$GLB,,, | Performed by: NURSE PRACTITIONER

## 2021-01-01 PROCEDURE — 99999 PR PBB SHADOW E&M-EST. PATIENT-LVL IV: ICD-10-PCS | Mod: PBBFAC,GC,, | Performed by: STUDENT IN AN ORGANIZED HEALTH CARE EDUCATION/TRAINING PROGRAM

## 2021-01-01 PROCEDURE — 82607 VITAMIN B-12: CPT | Performed by: INTERNAL MEDICINE

## 2021-01-01 PROCEDURE — 25000003 PHARM REV CODE 250: Performed by: EMERGENCY MEDICINE

## 2021-01-01 PROCEDURE — 80053 COMPREHEN METABOLIC PANEL: CPT | Performed by: PHYSICIAN ASSISTANT

## 2021-01-01 PROCEDURE — 93010 EKG 12-LEAD: ICD-10-PCS | Mod: ,,, | Performed by: INTERNAL MEDICINE

## 2021-01-01 PROCEDURE — 91300 COVID-19, MRNA, LNP-S, PF, 30 MCG/0.3 ML DOSE VACCINE: ICD-10-PCS | Mod: S$GLB,,, | Performed by: EMERGENCY MEDICINE

## 2021-01-01 PROCEDURE — 99215 PR OFFICE/OUTPT VISIT, EST, LEVL V, 40-54 MIN: ICD-10-PCS | Mod: S$GLB,,, | Performed by: INTERNAL MEDICINE

## 2021-01-01 PROCEDURE — 82962 GLUCOSE BLOOD TEST: CPT

## 2021-01-01 PROCEDURE — G0378 HOSPITAL OBSERVATION PER HR: HCPCS

## 2021-01-01 PROCEDURE — 99214 OFFICE O/P EST MOD 30 MIN: CPT | Mod: 95,,, | Performed by: INTERNAL MEDICINE

## 2021-01-01 PROCEDURE — 81000 URINALYSIS NONAUTO W/SCOPE: CPT | Performed by: EMERGENCY MEDICINE

## 2021-01-01 PROCEDURE — 85025 COMPLETE CBC W/AUTO DIFF WBC: CPT | Performed by: EMERGENCY MEDICINE

## 2021-01-01 PROCEDURE — 83735 ASSAY OF MAGNESIUM: CPT | Performed by: PHYSICIAN ASSISTANT

## 2021-01-01 PROCEDURE — 99214 OFFICE O/P EST MOD 30 MIN: CPT | Mod: PBBFAC | Performed by: STUDENT IN AN ORGANIZED HEALTH CARE EDUCATION/TRAINING PROGRAM

## 2021-01-01 PROCEDURE — 91300 PHARM REV CODE 636 W HCPCS: CPT | Performed by: EMERGENCY MEDICINE

## 2021-01-01 PROCEDURE — 74018 XR ABDOMEN AP 1 VIEW: ICD-10-PCS | Mod: 26,,, | Performed by: RADIOLOGY

## 2021-01-01 PROCEDURE — 84443 ASSAY THYROID STIM HORMONE: CPT | Performed by: INTERNAL MEDICINE

## 2021-01-01 PROCEDURE — 63600175 PHARM REV CODE 636 W HCPCS: Performed by: EMERGENCY MEDICINE

## 2021-01-01 PROCEDURE — 99497 PR ADVNCD CARE PLAN 30 MIN: ICD-10-PCS | Mod: S$GLB,,, | Performed by: NURSE PRACTITIONER

## 2021-01-01 PROCEDURE — 96372 THER/PROPH/DIAG INJ SC/IM: CPT | Mod: 59 | Performed by: EMERGENCY MEDICINE

## 2021-01-01 PROCEDURE — 36415 COLL VENOUS BLD VENIPUNCTURE: CPT | Performed by: HOSPITALIST

## 2021-01-01 PROCEDURE — G0180 PR HOME HEALTH MD CERTIFICATION: ICD-10-PCS | Mod: ,,, | Performed by: INTERNAL MEDICINE

## 2021-01-01 PROCEDURE — 99285 EMERGENCY DEPT VISIT HI MDM: CPT | Mod: 25

## 2021-01-01 PROCEDURE — U0002 COVID-19 LAB TEST NON-CDC: HCPCS | Performed by: EMERGENCY MEDICINE

## 2021-01-01 PROCEDURE — 99203 PR OFFICE/OUTPT VISIT, NEW, LEVL III, 30-44 MIN: ICD-10-PCS | Mod: S$PBB,GC,, | Performed by: STUDENT IN AN ORGANIZED HEALTH CARE EDUCATION/TRAINING PROGRAM

## 2021-01-01 PROCEDURE — 0002A COVID-19, MRNA, LNP-S, PF, 30 MCG/0.3 ML DOSE VACCINE: ICD-10-PCS | Mod: CV19,S$GLB,, | Performed by: EMERGENCY MEDICINE

## 2021-01-01 PROCEDURE — 63600175 PHARM REV CODE 636 W HCPCS: Performed by: HOSPITALIST

## 2021-01-01 PROCEDURE — 99220 PR INITIAL OBSERVATION CARE,LEVL III: CPT | Mod: ,,, | Performed by: HOSPITALIST

## 2021-01-01 PROCEDURE — 82746 ASSAY OF FOLIC ACID SERUM: CPT | Performed by: INTERNAL MEDICINE

## 2021-01-01 PROCEDURE — 0002A COVID-19, MRNA, LNP-S, PF, 30 MCG/0.3 ML DOSE VACCINE: CPT | Mod: CV19,S$GLB,, | Performed by: EMERGENCY MEDICINE

## 2021-01-01 PROCEDURE — 36415 COLL VENOUS BLD VENIPUNCTURE: CPT | Performed by: INTERNAL MEDICINE

## 2021-01-01 PROCEDURE — 83880 ASSAY OF NATRIURETIC PEPTIDE: CPT | Performed by: EMERGENCY MEDICINE

## 2021-01-01 PROCEDURE — 25000003 PHARM REV CODE 250: Performed by: HOSPITALIST

## 2021-01-01 PROCEDURE — 99215 OFFICE O/P EST HI 40 MIN: CPT | Mod: S$GLB,,, | Performed by: INTERNAL MEDICINE

## 2021-01-01 PROCEDURE — 83605 ASSAY OF LACTIC ACID: CPT | Performed by: PHYSICIAN ASSISTANT

## 2021-01-01 PROCEDURE — 84100 ASSAY OF PHOSPHORUS: CPT | Performed by: HOSPITALIST

## 2021-01-01 PROCEDURE — 99417 PR PROLONGED SVC, OUTPT, W/WO DIRECT PT CONTACT,  EA ADDTL 15 MIN: ICD-10-PCS | Mod: S$GLB,,, | Performed by: INTERNAL MEDICINE

## 2021-01-01 PROCEDURE — 99417 PROLNG OP E/M EACH 15 MIN: CPT | Mod: S$GLB,,, | Performed by: INTERNAL MEDICINE

## 2021-01-01 PROCEDURE — 96374 THER/PROPH/DIAG INJ IV PUSH: CPT | Performed by: EMERGENCY MEDICINE

## 2021-01-01 PROCEDURE — 86592 SYPHILIS TEST NON-TREP QUAL: CPT | Performed by: INTERNAL MEDICINE

## 2021-01-01 PROCEDURE — 99350 HOME/RES VST EST HIGH MDM 60: CPT | Mod: S$GLB,,, | Performed by: NURSE PRACTITIONER

## 2021-01-01 PROCEDURE — 85025 COMPLETE CBC W/AUTO DIFF WBC: CPT | Performed by: PHYSICIAN ASSISTANT

## 2021-01-01 PROCEDURE — 74018 RADEX ABDOMEN 1 VIEW: CPT | Mod: TC

## 2021-01-01 PROCEDURE — 96361 HYDRATE IV INFUSION ADD-ON: CPT

## 2021-01-01 PROCEDURE — G0180 MD CERTIFICATION HHA PATIENT: HCPCS | Mod: ,,, | Performed by: INTERNAL MEDICINE

## 2021-01-01 PROCEDURE — 81003 URINALYSIS AUTO W/O SCOPE: CPT | Performed by: PHYSICIAN ASSISTANT

## 2021-01-01 PROCEDURE — 63600175 PHARM REV CODE 636 W HCPCS: Performed by: NURSE PRACTITIONER

## 2021-01-01 PROCEDURE — 99203 OFFICE O/P NEW LOW 30 MIN: CPT | Mod: S$PBB,GC,, | Performed by: STUDENT IN AN ORGANIZED HEALTH CARE EDUCATION/TRAINING PROGRAM

## 2021-01-01 PROCEDURE — 83735 ASSAY OF MAGNESIUM: CPT | Performed by: HOSPITALIST

## 2021-01-01 PROCEDURE — U0002 COVID-19 LAB TEST NON-CDC: HCPCS | Performed by: PHYSICIAN ASSISTANT

## 2021-01-01 PROCEDURE — 99220 PR INITIAL OBSERVATION CARE,LEVL III: ICD-10-PCS | Mod: ,,, | Performed by: HOSPITALIST

## 2021-01-01 PROCEDURE — 99999 PR PBB SHADOW E&M-EST. PATIENT-LVL IV: CPT | Mod: PBBFAC,GC,, | Performed by: STUDENT IN AN ORGANIZED HEALTH CARE EDUCATION/TRAINING PROGRAM

## 2021-01-01 PROCEDURE — 80061 LIPID PANEL: CPT | Performed by: INTERNAL MEDICINE

## 2021-01-01 PROCEDURE — 99217 PR OBSERVATION CARE DISCHARGE: ICD-10-PCS | Mod: ,,, | Performed by: HOSPITALIST

## 2021-01-01 PROCEDURE — 0001A HC IMMUNIZ ADMIN, SARS-COV-2 COVID-19 VACC, 30MCG/0.3ML, 1ST DOSE: CPT | Performed by: EMERGENCY MEDICINE

## 2021-01-01 PROCEDURE — 80053 COMPREHEN METABOLIC PANEL: CPT | Performed by: EMERGENCY MEDICINE

## 2021-01-01 RX ORDER — ASPIRIN 81 MG/1
81 TABLET ORAL DAILY
Status: DISCONTINUED | OUTPATIENT
Start: 2021-01-01 | End: 2021-01-01

## 2021-01-01 RX ORDER — TALC
6 POWDER (GRAM) TOPICAL NIGHTLY
Status: DISCONTINUED | OUTPATIENT
Start: 2021-01-01 | End: 2021-01-01 | Stop reason: HOSPADM

## 2021-01-01 RX ORDER — BIMATOPROST 0.1 MG/ML
1 SOLUTION/ DROPS OPHTHALMIC NIGHTLY
Qty: 7.5 ML | Refills: 1 | OUTPATIENT
Start: 2021-01-01

## 2021-01-01 RX ORDER — HYDRALAZINE HYDROCHLORIDE 20 MG/ML
10 INJECTION INTRAMUSCULAR; INTRAVENOUS EVERY 8 HOURS PRN
Status: DISCONTINUED | OUTPATIENT
Start: 2021-01-01 | End: 2021-01-01 | Stop reason: HOSPADM

## 2021-01-01 RX ORDER — TALC
6 POWDER (GRAM) TOPICAL NIGHTLY PRN
Status: DISCONTINUED | OUTPATIENT
Start: 2021-01-01 | End: 2021-01-01

## 2021-01-01 RX ORDER — FLUTICASONE PROPIONATE 50 MCG
2 SPRAY, SUSPENSION (ML) NASAL DAILY
Qty: 9.9 ML | Refills: 3 | Status: SHIPPED | OUTPATIENT
Start: 2021-01-01

## 2021-01-01 RX ORDER — ACETAMINOPHEN 500 MG
1000 TABLET ORAL 2 TIMES DAILY
Status: DISCONTINUED | OUTPATIENT
Start: 2021-01-01 | End: 2021-01-01 | Stop reason: HOSPADM

## 2021-01-01 RX ORDER — TALC
6 POWDER (GRAM) TOPICAL NIGHTLY
Refills: 0 | COMMUNITY
Start: 2021-01-01

## 2021-01-01 RX ORDER — BRIMONIDINE TARTRATE 1.5 MG/ML
1 SOLUTION/ DROPS OPHTHALMIC 2 TIMES DAILY
Status: DISCONTINUED | OUTPATIENT
Start: 2021-01-01 | End: 2021-01-01 | Stop reason: HOSPADM

## 2021-01-01 RX ORDER — BRIMONIDINE TARTRATE, TIMOLOL MALEATE 2; 5 MG/ML; MG/ML
SOLUTION/ DROPS OPHTHALMIC
Qty: 5 ML | Refills: 2 | OUTPATIENT
Start: 2021-01-01

## 2021-01-01 RX ORDER — ENOXAPARIN SODIUM 100 MG/ML
40 INJECTION SUBCUTANEOUS EVERY 24 HOURS
Status: DISCONTINUED | OUTPATIENT
Start: 2021-01-01 | End: 2021-01-01 | Stop reason: HOSPADM

## 2021-01-01 RX ORDER — BRIMONIDINE TARTRATE AND TIMOLOL MALEATE 2; 5 MG/ML; MG/ML
1 SOLUTION OPHTHALMIC 2 TIMES DAILY
Status: DISCONTINUED | OUTPATIENT
Start: 2021-01-01 | End: 2021-01-01 | Stop reason: SDUPTHER

## 2021-01-01 RX ORDER — BRIMONIDINE TARTRATE, TIMOLOL MALEATE 2; 5 MG/ML; MG/ML
1 SOLUTION/ DROPS OPHTHALMIC 2 TIMES DAILY
Qty: 5 ML | Refills: 6 | OUTPATIENT
Start: 2021-01-01

## 2021-01-01 RX ORDER — FLUTICASONE PROPIONATE 50 MCG
2 SPRAY, SUSPENSION (ML) NASAL DAILY
Qty: 9.9 ML | Refills: 0 | Status: SHIPPED | OUTPATIENT
Start: 2021-01-01 | End: 2021-01-01 | Stop reason: SDUPTHER

## 2021-01-01 RX ORDER — TALC
6 POWDER (GRAM) TOPICAL NIGHTLY PRN
Status: DISCONTINUED | OUTPATIENT
Start: 2021-01-01 | End: 2021-01-01 | Stop reason: SDUPTHER

## 2021-01-01 RX ORDER — BRIMONIDINE TARTRATE, TIMOLOL MALEATE 2; 5 MG/ML; MG/ML
1 SOLUTION/ DROPS OPHTHALMIC 2 TIMES DAILY
Qty: 5 ML | Refills: 2 | Status: SHIPPED | OUTPATIENT
Start: 2021-01-01

## 2021-01-01 RX ORDER — ACETAMINOPHEN 325 MG/1
650 TABLET ORAL EVERY 4 HOURS PRN
Status: DISCONTINUED | OUTPATIENT
Start: 2021-01-01 | End: 2021-01-01

## 2021-01-01 RX ORDER — TIMOLOL MALEATE 5 MG/ML
1 SOLUTION/ DROPS OPHTHALMIC 2 TIMES DAILY
Status: DISCONTINUED | OUTPATIENT
Start: 2021-01-01 | End: 2021-01-01 | Stop reason: HOSPADM

## 2021-01-01 RX ORDER — DULOXETIN HYDROCHLORIDE 20 MG/1
20 CAPSULE, DELAYED RELEASE ORAL DAILY
Qty: 30 CAPSULE | Refills: 11 | Status: SHIPPED | OUTPATIENT
Start: 2021-01-01 | End: 2022-09-10

## 2021-01-01 RX ORDER — ONDANSETRON 2 MG/ML
4 INJECTION INTRAMUSCULAR; INTRAVENOUS EVERY 8 HOURS PRN
Status: DISCONTINUED | OUTPATIENT
Start: 2021-01-01 | End: 2021-01-01 | Stop reason: HOSPADM

## 2021-01-01 RX ORDER — SODIUM CHLORIDE 0.9 % (FLUSH) 0.9 %
10 SYRINGE (ML) INJECTION
Status: DISCONTINUED | OUTPATIENT
Start: 2021-01-01 | End: 2021-01-01 | Stop reason: HOSPADM

## 2021-01-01 RX ORDER — BRIMONIDINE TARTRATE, TIMOLOL MALEATE 2; 5 MG/ML; MG/ML
1 SOLUTION/ DROPS OPHTHALMIC 2 TIMES DAILY
Qty: 5 ML | Refills: 2 | OUTPATIENT
Start: 2021-01-01

## 2021-01-01 RX ORDER — CIPROFLOXACIN 2 MG/ML
400 INJECTION, SOLUTION INTRAVENOUS
Status: DISCONTINUED | OUTPATIENT
Start: 2021-01-01 | End: 2021-01-01

## 2021-01-01 RX ORDER — FLUTICASONE PROPIONATE 50 MCG
2 SPRAY, SUSPENSION (ML) NASAL DAILY
Qty: 9.9 ML | Refills: 3 | Status: SHIPPED | OUTPATIENT
Start: 2021-01-01 | End: 2021-01-01 | Stop reason: SDUPTHER

## 2021-01-01 RX ORDER — BIMATOPROST 0.1 MG/ML
1 SOLUTION/ DROPS OPHTHALMIC NIGHTLY
Qty: 7.5 ML | Refills: 1 | Status: SHIPPED | OUTPATIENT
Start: 2021-01-01 | End: 2021-01-01 | Stop reason: SDUPTHER

## 2021-01-01 RX ORDER — BIMATOPROST 0.1 MG/ML
1 SOLUTION/ DROPS OPHTHALMIC NIGHTLY
Qty: 7.5 ML | Refills: 1 | Status: SHIPPED | OUTPATIENT
Start: 2021-01-01

## 2021-01-01 RX ORDER — SODIUM CHLORIDE 0.9 % (FLUSH) 0.9 %
10 SYRINGE (ML) INJECTION
Status: DISCONTINUED | OUTPATIENT
Start: 2021-01-01 | End: 2021-01-01

## 2021-01-01 RX ORDER — BRIMONIDINE TARTRATE, TIMOLOL MALEATE 2; 5 MG/ML; MG/ML
1 SOLUTION/ DROPS OPHTHALMIC 2 TIMES DAILY
Qty: 5 ML | Refills: 2 | Status: SHIPPED | OUTPATIENT
Start: 2021-01-01 | End: 2021-01-01 | Stop reason: SDUPTHER

## 2021-01-01 RX ORDER — ACETAMINOPHEN 500 MG
1000 TABLET ORAL 2 TIMES DAILY
Refills: 0 | COMMUNITY
Start: 2021-01-01

## 2021-01-01 RX ORDER — NITROGLYCERIN 0.4 MG/1
0.4 TABLET SUBLINGUAL EVERY 5 MIN PRN
Qty: 25 TABLET | Refills: 3 | Status: SHIPPED | OUTPATIENT
Start: 2021-01-01 | End: 2021-01-01

## 2021-01-01 RX ADMIN — TIMOLOL MALEATE 1 DROP: 5 SOLUTION OPHTHALMIC at 09:08

## 2021-01-01 RX ADMIN — Medication 6 MG: at 09:08

## 2021-01-01 RX ADMIN — ACETAMINOPHEN 1000 MG: 500 TABLET, FILM COATED ORAL at 09:08

## 2021-01-01 RX ADMIN — SODIUM CHLORIDE 500 ML: 0.9 INJECTION, SOLUTION INTRAVENOUS at 01:08

## 2021-01-01 RX ADMIN — BRIMONIDINE TARTRATE 1 DROP: 1.5 SOLUTION OPHTHALMIC at 09:08

## 2021-01-01 RX ADMIN — HYDRALAZINE HYDROCHLORIDE 10 MG: 20 INJECTION, SOLUTION INTRAMUSCULAR; INTRAVENOUS at 06:08

## 2021-01-01 RX ADMIN — ENOXAPARIN SODIUM 40 MG: 40 INJECTION SUBCUTANEOUS at 05:08

## 2021-01-01 RX ADMIN — BIMATOPROST 1 DROP: 0.1 SOLUTION/ DROPS OPHTHALMIC at 11:08

## 2021-01-01 RX ADMIN — RNA INGREDIENT BNT-162B2 0.3 ML: 0.23 INJECTION, SUSPENSION INTRAMUSCULAR at 12:08

## 2021-08-16 NOTE — TELEPHONE ENCOUNTER
Pt's daughter Urszula called. She is asking to come in same date but later time. Its hard to get her up and going in the morning. Is it ok to tell her 9am on 8/24/21? Please advise.

## 2021-08-23 PROBLEM — R64 CACHEXIA: Status: ACTIVE | Noted: 2021-01-01

## 2021-08-23 PROBLEM — I77.1 TORTUOUS AORTA: Status: ACTIVE | Noted: 2021-01-01

## 2021-08-23 PROBLEM — I25.118 CORONARY ARTERY DISEASE OF NATIVE ARTERY OF NATIVE HEART WITH STABLE ANGINA PECTORIS: Status: ACTIVE | Noted: 2021-01-01

## 2021-08-23 PROBLEM — E77.8 HYPOPROTEINEMIA: Status: ACTIVE | Noted: 2021-01-01

## 2021-08-23 PROBLEM — Z00.00 HEALTHCARE MAINTENANCE: Status: ACTIVE | Noted: 2021-01-01

## 2021-08-23 PROBLEM — L89.152 PRESSURE INJURY OF SACRAL REGION, STAGE 2: Status: ACTIVE | Noted: 2021-01-01

## 2021-08-24 PROBLEM — F03.90 DEMENTIA: Status: ACTIVE | Noted: 2021-01-01

## 2021-08-24 PROBLEM — T76.01XA SUSPECTED ELDERLY VICTIM OF NEGLECT: Status: ACTIVE | Noted: 2021-01-01

## 2021-08-24 PROBLEM — H54.3 IMPAIRED VISION IN BOTH EYES: Status: ACTIVE | Noted: 2021-01-01

## 2021-08-24 PROBLEM — F44.4 FUNCTIONAL PARAPARESIS: Status: ACTIVE | Noted: 2021-01-01

## 2021-08-28 PROBLEM — E43 SEVERE PROTEIN-ENERGY MALNUTRITION: Status: ACTIVE | Noted: 2021-01-01

## 2021-08-28 PROBLEM — R55 SYNCOPE: Status: ACTIVE | Noted: 2021-01-01

## 2021-08-28 PROBLEM — R41.82 ALTERED MENTAL STATUS: Status: ACTIVE | Noted: 2021-01-01

## 2021-09-08 NOTE — TELEPHONE ENCOUNTER
----- Message from Sherlyn Powers sent at 9/8/2021  1:59 PM CDT -----  Who Called: Daughter Danial Yao  What is the reqeust in detail: Daughter has been giving mother the tablets but believes that this is not working. Daughter has been crushing the tablets up to administer to her mother and her mother will still sit moaning from the pain. Requesting that the following prescription be changed to the liquid form and or something stronger be sent to alleviate her mother from the pain starting with the lowest dosage.     acetaminophen (TYLENOL) 500 MG tablet  0 8/29/2021  No  Sig - Route: Take 2 tablets (1,000 mg total) by mouth 2 (two) times a day. - Oral  Class: OTC    [id number] [address] [facility]; [phone]  Can the clinic reply by [facility]? No  Best Call Back Number: [phone] - Najma  Additional Information: Please advise.

## 2021-09-09 NOTE — TELEPHONE ENCOUNTER
Spoke to Urszula daughter but she asked me to call Najma at [phone], attempted to call no answer and can not leave a voicemail; will attempt to call again.

## 2021-09-09 NOTE — TELEPHONE ENCOUNTER
Spoke to Najma daughter/caretaker. She is in pain especially at night. Appt scheduled with Dr Grace Segovia 9/10 3pm to address this.

## 2021-09-09 NOTE — TELEPHONE ENCOUNTER
----- Message from Sarah Pena sent at 9/9/2021  2:12 PM CDT -----  Contact: [phone]  Patient is returning a phone call.  Who left a message for the patient:   Does patient know what this is regarding:    Comments:

## 2021-09-10 PROBLEM — M24.561 CONTRACTURES OF BOTH KNEES: Status: ACTIVE | Noted: 2021-01-01

## 2021-09-10 PROBLEM — R73.9 HYPERGLYCEMIA: Status: ACTIVE | Noted: 2021-01-01

## 2021-09-10 PROBLEM — M24.562 CONTRACTURES OF BOTH KNEES: Status: ACTIVE | Noted: 2021-01-01

## 2021-09-13 NOTE — TELEPHONE ENCOUNTER
----- Message from Xiomara Edwards MD sent at 9/13/2021  6:47 AM CDT -----  Please call patient with results.  No reversible cause for memory loss noted.  Normal /elevated vitamin levels.

## 2021-09-13 NOTE — TELEPHONE ENCOUNTER
Spoke to patient's daughter Urszula, she is wondering if you need medical records from physical therapy and eye doctor in [place]. If so, she can given info on that so we can request. Please advise.

## 2021-10-04 NOTE — TELEPHONE ENCOUNTER
----- Message from Joshua Yanes sent at 10/4/2021  8:15 AM CDT -----  Contact: Patient [phone]  Requesting an RX refill or new RX.  Is this a refill or new RX: refill   RX name and strength (copy/paste from chart): COMBIGAN 0.2-0.5 % Drop  Is this a 30 day or 90 day RX:   Patient advised that in the future they can use their [facility] account to request a refill?:    Pharmacy name and phone # (copy/paste from chart):  [id number] [place] [place] - [address]   Phone:  [phone]  Fax:  [phone]        Comments:

## 2021-11-22 PROBLEM — Z00.00 HEALTHCARE MAINTENANCE: Status: RESOLVED | Noted: 2021-01-01 | Resolved: 2021-01-01

## 2021-11-26 NOTE — TELEPHONE ENCOUNTER
Patient's ABD XR did not show obstruction. Please advise her to take oral laxatives, warm prune juice, a hot water bottle to the abdomen, and to move around.        Thanks,  KJ

## 2021-11-29 NOTE — TELEPHONE ENCOUNTER
Spoke to patient's daughter about this on Friday 11/26/21, rx and recommendation given. Advised to call us if not better.

## 2021-11-30 PROBLEM — K59.01 SLOW TRANSIT CONSTIPATION: Status: ACTIVE | Noted: 2021-01-01

## 2021-11-30 NOTE — TELEPHONE ENCOUNTER
Pts daughter wanted to mention that she wanted to restart PT in January with [facility]. Would this be ok?

## 2022-01-01 ENCOUNTER — TELEPHONE (OUTPATIENT)
Dept: PRIMARY CARE CLINIC | Facility: CLINIC | Age: 87
End: 2022-01-01
Payer: MEDICARE

## 2022-01-01 ENCOUNTER — HOSPITAL ENCOUNTER (INPATIENT)
Facility: HOSPITAL | Age: 87
LOS: 1 days | DRG: 871 | End: 2022-01-15
Attending: EMERGENCY MEDICINE | Admitting: INTERNAL MEDICINE
Payer: MEDICARE

## 2022-01-01 VITALS
HEART RATE: 80 BPM | OXYGEN SATURATION: 54 % | TEMPERATURE: 99 F | DIASTOLIC BLOOD PRESSURE: 58 MMHG | SYSTOLIC BLOOD PRESSURE: 75 MMHG | BODY MASS INDEX: 15.06 KG/M2 | HEIGHT: 63 IN | RESPIRATION RATE: 54 BRPM | WEIGHT: 85 LBS

## 2022-01-01 DIAGNOSIS — E87.20 LACTIC ACIDOSIS: ICD-10-CM

## 2022-01-01 DIAGNOSIS — R46.89 ALTERED BEHAVIOR: ICD-10-CM

## 2022-01-01 DIAGNOSIS — Z46.59 ENCOUNTER FOR OROGASTRIC (OG) TUBE PLACEMENT: ICD-10-CM

## 2022-01-01 DIAGNOSIS — H40.1133 PRIMARY OPEN ANGLE GLAUCOMA (POAG) OF BOTH EYES, SEVERE STAGE: ICD-10-CM

## 2022-01-01 DIAGNOSIS — R65.21 SEPTIC SHOCK: Primary | ICD-10-CM

## 2022-01-01 DIAGNOSIS — A41.9 SEPTIC SHOCK: Primary | ICD-10-CM

## 2022-01-01 DIAGNOSIS — N17.9 AKI (ACUTE KIDNEY INJURY): ICD-10-CM

## 2022-01-01 DIAGNOSIS — R65.20 SEPSIS WITH MULTIPLE ORGAN DYSFUNCTION (MOD): ICD-10-CM

## 2022-01-01 DIAGNOSIS — A41.9 SEPSIS WITH MULTIPLE ORGAN DYSFUNCTION (MOD): ICD-10-CM

## 2022-01-01 DIAGNOSIS — D72.829 LEUKOCYTOSIS, UNSPECIFIED TYPE: ICD-10-CM

## 2022-01-01 LAB
ALBUMIN SERPL BCP-MCNC: 1.5 G/DL (ref 3.5–5.2)
ALBUMIN SERPL BCP-MCNC: 2.3 G/DL (ref 3.5–5.2)
ALLENS TEST: ABNORMAL
ALLENS TEST: ABNORMAL
ALP SERPL-CCNC: 139 U/L (ref 55–135)
ALP SERPL-CCNC: 69 U/L (ref 55–135)
ALT SERPL W/O P-5'-P-CCNC: 112 U/L (ref 10–44)
ALT SERPL W/O P-5'-P-CCNC: 127 U/L (ref 10–44)
AMORPH CRY UR QL COMP ASSIST: ABNORMAL
ANION GAP SERPL CALC-SCNC: 21 MMOL/L (ref 8–16)
ANION GAP SERPL CALC-SCNC: 30 MMOL/L (ref 8–16)
ANISOCYTOSIS BLD QL SMEAR: SLIGHT
ANISOCYTOSIS BLD QL SMEAR: SLIGHT
AST SERPL-CCNC: 149 U/L (ref 10–40)
AST SERPL-CCNC: 158 U/L (ref 10–40)
BACTERIA #/AREA URNS AUTO: ABNORMAL /HPF
BASOPHILS # BLD AUTO: ABNORMAL K/UL (ref 0–0.2)
BASOPHILS # BLD AUTO: ABNORMAL K/UL (ref 0–0.2)
BASOPHILS NFR BLD: 0 % (ref 0–1.9)
BASOPHILS NFR BLD: 0 % (ref 0–1.9)
BILIRUB SERPL-MCNC: 0.4 MG/DL (ref 0.1–1)
BILIRUB SERPL-MCNC: 0.6 MG/DL (ref 0.1–1)
BILIRUB UR QL STRIP: NEGATIVE
BILIRUB UR QL STRIP: NEGATIVE
BUN SERPL-MCNC: 37 MG/DL (ref 8–23)
BUN SERPL-MCNC: 40 MG/DL (ref 8–23)
BURR CELLS BLD QL SMEAR: ABNORMAL
CALCIUM SERPL-MCNC: 7.7 MG/DL (ref 8.7–10.5)
CALCIUM SERPL-MCNC: 9.6 MG/DL (ref 8.7–10.5)
CHLORIDE SERPL-SCNC: 107 MMOL/L (ref 95–110)
CHLORIDE SERPL-SCNC: 93 MMOL/L (ref 95–110)
CLARITY UR REFRACT.AUTO: ABNORMAL
CLARITY UR REFRACT.AUTO: ABNORMAL
CO2 SERPL-SCNC: 12 MMOL/L (ref 23–29)
CO2 SERPL-SCNC: 55 MMOL/L (ref 23–29)
COLOR UR AUTO: YELLOW
COLOR UR AUTO: YELLOW
CREAT SERPL-MCNC: 2 MG/DL (ref 0.5–1.4)
CREAT SERPL-MCNC: 2.1 MG/DL (ref 0.5–1.4)
CTP QC/QA: YES
DELSYS: ABNORMAL
DELSYS: ABNORMAL
DIFFERENTIAL METHOD: ABNORMAL
DIFFERENTIAL METHOD: ABNORMAL
EOSINOPHIL # BLD AUTO: ABNORMAL K/UL (ref 0–0.5)
EOSINOPHIL # BLD AUTO: ABNORMAL K/UL (ref 0–0.5)
EOSINOPHIL NFR BLD: 0 % (ref 0–8)
EOSINOPHIL NFR BLD: 0 % (ref 0–8)
ERYTHROCYTE [DISTWIDTH] IN BLOOD BY AUTOMATED COUNT: 14.3 % (ref 11.5–14.5)
ERYTHROCYTE [DISTWIDTH] IN BLOOD BY AUTOMATED COUNT: 14.8 % (ref 11.5–14.5)
ERYTHROCYTE [SEDIMENTATION RATE] IN BLOOD BY WESTERGREN METHOD: 14 MM/H
ERYTHROCYTE [SEDIMENTATION RATE] IN BLOOD BY WESTERGREN METHOD: 24 MM/H
EST. GFR  (AFRICAN AMERICAN): 23.4 ML/MIN/1.73 M^2
EST. GFR  (AFRICAN AMERICAN): 24.8 ML/MIN/1.73 M^2
EST. GFR  (NON AFRICAN AMERICAN): 20.3 ML/MIN/1.73 M^2
EST. GFR  (NON AFRICAN AMERICAN): 21.5 ML/MIN/1.73 M^2
FIO2: 100
FIO2: 70
GLUCOSE SERPL-MCNC: 102 MG/DL (ref 70–110)
GLUCOSE SERPL-MCNC: 108 MG/DL (ref 70–110)
GLUCOSE UR QL STRIP: NEGATIVE
GLUCOSE UR QL STRIP: NEGATIVE
HCO3 UR-SCNC: 12 MMOL/L (ref 24–28)
HCO3 UR-SCNC: 12.2 MMOL/L (ref 24–28)
HCT VFR BLD AUTO: 33.4 % (ref 37–48.5)
HCT VFR BLD AUTO: 41.6 % (ref 37–48.5)
HGB BLD-MCNC: 10.7 G/DL (ref 12–16)
HGB BLD-MCNC: 12 G/DL (ref 12–16)
HGB UR QL STRIP: ABNORMAL
HGB UR QL STRIP: ABNORMAL
HYALINE CASTS UR QL AUTO: 0 /LPF
IMM GRANULOCYTES # BLD AUTO: ABNORMAL K/UL (ref 0–0.04)
IMM GRANULOCYTES # BLD AUTO: ABNORMAL K/UL (ref 0–0.04)
IMM GRANULOCYTES NFR BLD AUTO: ABNORMAL % (ref 0–0.5)
IMM GRANULOCYTES NFR BLD AUTO: ABNORMAL % (ref 0–0.5)
INR PPP: 1.6 (ref 0.8–1.2)
KETONES UR QL STRIP: ABNORMAL
KETONES UR QL STRIP: ABNORMAL
LACTATE SERPL-SCNC: 11.3 MMOL/L (ref 0.5–2.2)
LACTATE SERPL-SCNC: >12 MMOL/L (ref 0.5–2.2)
LACTATE SERPL-SCNC: >12 MMOL/L (ref 0.5–2.2)
LEUKOCYTE ESTERASE UR QL STRIP: ABNORMAL
LEUKOCYTE ESTERASE UR QL STRIP: ABNORMAL
LYMPHOCYTES # BLD AUTO: ABNORMAL K/UL (ref 1–4.8)
LYMPHOCYTES # BLD AUTO: ABNORMAL K/UL (ref 1–4.8)
LYMPHOCYTES NFR BLD: 12 % (ref 18–48)
LYMPHOCYTES NFR BLD: 3 % (ref 18–48)
MAGNESIUM SERPL-MCNC: 2.2 MG/DL (ref 1.6–2.6)
MCH RBC QN AUTO: 28.6 PG (ref 27–31)
MCH RBC QN AUTO: 28.7 PG (ref 27–31)
MCHC RBC AUTO-ENTMCNC: 28.8 G/DL (ref 32–36)
MCHC RBC AUTO-ENTMCNC: 32 G/DL (ref 32–36)
MCV RBC AUTO: 100 FL (ref 82–98)
MCV RBC AUTO: 89 FL (ref 82–98)
METAMYELOCYTES NFR BLD MANUAL: 4 %
MICROSCOPIC COMMENT: ABNORMAL
MIN VOL: 5.41
MODE: ABNORMAL
MODE: ABNORMAL
MONOCYTES # BLD AUTO: ABNORMAL K/UL (ref 0.3–1)
MONOCYTES # BLD AUTO: ABNORMAL K/UL (ref 0.3–1)
MONOCYTES NFR BLD: 1 % (ref 4–15)
MONOCYTES NFR BLD: 3 % (ref 4–15)
MYELOCYTES NFR BLD MANUAL: 2 %
NEUTROPHILS NFR BLD: 79 % (ref 38–73)
NEUTROPHILS NFR BLD: 83 % (ref 38–73)
NEUTS BAND NFR BLD MANUAL: 13 %
NITRITE UR QL STRIP: NEGATIVE
NITRITE UR QL STRIP: NEGATIVE
NRBC BLD-RTO: 0 /100 WBC
NRBC BLD-RTO: 1 /100 WBC
PCO2 BLDA: 45 MMHG (ref 35–45)
PCO2 BLDA: 49.3 MMHG (ref 35–45)
PEEP: 5
PEEP: 5
PH SMN: 7 [PH] (ref 7.35–7.45)
PH SMN: 7.03 [PH] (ref 7.35–7.45)
PH UR STRIP: 7 [PH] (ref 5–8)
PH UR STRIP: 7 [PH] (ref 5–8)
PHOSPHATE SERPL-MCNC: 7.1 MG/DL (ref 2.7–4.5)
PIP: 31
PLATELET # BLD AUTO: 102 K/UL (ref 150–450)
PLATELET # BLD AUTO: 129 K/UL (ref 150–450)
PLATELET BLD QL SMEAR: ABNORMAL
PLATELET BLD QL SMEAR: ABNORMAL
PMV BLD AUTO: 10.6 FL (ref 9.2–12.9)
PMV BLD AUTO: 11 FL (ref 9.2–12.9)
PO2 BLDA: 219 MMHG (ref 80–100)
PO2 BLDA: 67 MMHG (ref 80–100)
POC BE: -19 MMOL/L
POC BE: -19 MMOL/L
POC SATURATED O2: 82 % (ref 95–100)
POC SATURATED O2: 99 % (ref 95–100)
POC TCO2: 13 MMOL/L (ref 23–27)
POC TCO2: 14 MMOL/L (ref 23–27)
POIKILOCYTOSIS BLD QL SMEAR: SLIGHT
POIKILOCYTOSIS BLD QL SMEAR: SLIGHT
POLYCHROMASIA BLD QL SMEAR: ABNORMAL
POLYCHROMASIA BLD QL SMEAR: ABNORMAL
POTASSIUM SERPL-SCNC: 4.4 MMOL/L (ref 3.5–5.1)
POTASSIUM SERPL-SCNC: 5.1 MMOL/L (ref 3.5–5.1)
PROT SERPL-MCNC: 4 G/DL (ref 6–8.4)
PROT SERPL-MCNC: 6.3 G/DL (ref 6–8.4)
PROT UR QL STRIP: ABNORMAL
PROT UR QL STRIP: ABNORMAL
PROTHROMBIN TIME: 16.6 SEC (ref 9–12.5)
RBC # BLD AUTO: 3.74 M/UL (ref 4–5.4)
RBC # BLD AUTO: 4.18 M/UL (ref 4–5.4)
RBC #/AREA URNS AUTO: 8 /HPF (ref 0–4)
SAMPLE: ABNORMAL
SAMPLE: ABNORMAL
SARS-COV-2 RDRP RESP QL NAA+PROBE: NEGATIVE
SCHISTOCYTES BLD QL SMEAR: ABNORMAL
SITE: ABNORMAL
SITE: ABNORMAL
SODIUM SERPL-SCNC: 140 MMOL/L (ref 136–145)
SODIUM SERPL-SCNC: 178 MMOL/L (ref 136–145)
SP GR UR STRIP: 1.01 (ref 1–1.03)
SP GR UR STRIP: 1.01 (ref 1–1.03)
SP02: 62
SQUAMOUS #/AREA URNS AUTO: 16 /HPF
T4 FREE SERPL-MCNC: 0.82 NG/DL (ref 0.71–1.51)
TARGETS BLD QL SMEAR: ABNORMAL
TROPONIN I SERPL DL<=0.01 NG/ML-MCNC: 0.23 NG/ML (ref 0–0.03)
TROPONIN I SERPL DL<=0.01 NG/ML-MCNC: 0.23 NG/ML (ref 0–0.03)
TSH SERPL DL<=0.005 MIU/L-ACNC: 4.7 UIU/ML (ref 0.4–4)
URN SPEC COLLECT METH UR: ABNORMAL
URN SPEC COLLECT METH UR: ABNORMAL
VT: 380
VT: 380
WBC # BLD AUTO: 11.18 K/UL (ref 3.9–12.7)
WBC # BLD AUTO: 17.91 K/UL (ref 3.9–12.7)
WBC #/AREA URNS AUTO: >100 /HPF (ref 0–5)
WBC TOXIC VACUOLES BLD QL SMEAR: PRESENT
WBC TOXIC VACUOLES BLD QL SMEAR: PRESENT

## 2022-01-01 PROCEDURE — 25000003 PHARM REV CODE 250: Performed by: NURSE PRACTITIONER

## 2022-01-01 PROCEDURE — 99900026 HC AIRWAY MAINTENANCE (STAT)

## 2022-01-01 PROCEDURE — 99291 CRITICAL CARE FIRST HOUR: CPT | Mod: ,,, | Performed by: NURSE PRACTITIONER

## 2022-01-01 PROCEDURE — 27000221 HC OXYGEN, UP TO 24 HOURS

## 2022-01-01 PROCEDURE — 63600175 PHARM REV CODE 636 W HCPCS: Performed by: NURSE PRACTITIONER

## 2022-01-01 PROCEDURE — 93010 EKG 12-LEAD: ICD-10-PCS | Mod: ,,, | Performed by: INTERNAL MEDICINE

## 2022-01-01 PROCEDURE — 84443 ASSAY THYROID STIM HORMONE: CPT | Performed by: EMERGENCY MEDICINE

## 2022-01-01 PROCEDURE — 37799 UNLISTED PX VASCULAR SURGERY: CPT

## 2022-01-01 PROCEDURE — 94002 VENT MGMT INPAT INIT DAY: CPT

## 2022-01-01 PROCEDURE — S0030 INJECTION, METRONIDAZOLE: HCPCS | Performed by: NURSE PRACTITIONER

## 2022-01-01 PROCEDURE — 27100171 HC OXYGEN HIGH FLOW UP TO 24 HOURS

## 2022-01-01 PROCEDURE — 84439 ASSAY OF FREE THYROXINE: CPT | Performed by: EMERGENCY MEDICINE

## 2022-01-01 PROCEDURE — 87086 URINE CULTURE/COLONY COUNT: CPT | Performed by: EMERGENCY MEDICINE

## 2022-01-01 PROCEDURE — 93010 ELECTROCARDIOGRAM REPORT: CPT | Mod: ,,, | Performed by: INTERNAL MEDICINE

## 2022-01-01 PROCEDURE — 82803 BLOOD GASES ANY COMBINATION: CPT

## 2022-01-01 PROCEDURE — 85027 COMPLETE CBC AUTOMATED: CPT | Performed by: EMERGENCY MEDICINE

## 2022-01-01 PROCEDURE — 93005 ELECTROCARDIOGRAM TRACING: CPT

## 2022-01-01 PROCEDURE — U0002 COVID-19 LAB TEST NON-CDC: HCPCS | Performed by: EMERGENCY MEDICINE

## 2022-01-01 PROCEDURE — 25000003 PHARM REV CODE 250: Performed by: EMERGENCY MEDICINE

## 2022-01-01 PROCEDURE — 99291 PR CRITICAL CARE, E/M 30-74 MINUTES: ICD-10-PCS | Mod: ,,, | Performed by: NURSE PRACTITIONER

## 2022-01-01 PROCEDURE — 31500 INSERT EMERGENCY AIRWAY: CPT

## 2022-01-01 PROCEDURE — 80053 COMPREHEN METABOLIC PANEL: CPT | Performed by: NURSE PRACTITIONER

## 2022-01-01 PROCEDURE — 85027 COMPLETE CBC AUTOMATED: CPT | Performed by: NURSE PRACTITIONER

## 2022-01-01 PROCEDURE — 83605 ASSAY OF LACTIC ACID: CPT | Performed by: EMERGENCY MEDICINE

## 2022-01-01 PROCEDURE — 83605 ASSAY OF LACTIC ACID: CPT | Performed by: INTERNAL MEDICINE

## 2022-01-01 PROCEDURE — 83735 ASSAY OF MAGNESIUM: CPT | Performed by: NURSE PRACTITIONER

## 2022-01-01 PROCEDURE — 84484 ASSAY OF TROPONIN QUANT: CPT | Performed by: EMERGENCY MEDICINE

## 2022-01-01 PROCEDURE — 81001 URINALYSIS AUTO W/SCOPE: CPT | Performed by: EMERGENCY MEDICINE

## 2022-01-01 PROCEDURE — 63600175 PHARM REV CODE 636 W HCPCS: Performed by: INTERNAL MEDICINE

## 2022-01-01 PROCEDURE — 25000003 PHARM REV CODE 250: Performed by: INTERNAL MEDICINE

## 2022-01-01 PROCEDURE — 31500 PR INSERT, EMERGENCY ENDOTRACH AIRWAY: ICD-10-PCS | Mod: ,,, | Performed by: EMERGENCY MEDICINE

## 2022-01-01 PROCEDURE — 94003 VENT MGMT INPAT SUBQ DAY: CPT

## 2022-01-01 PROCEDURE — 20000000 HC ICU ROOM

## 2022-01-01 PROCEDURE — 85007 BL SMEAR W/DIFF WBC COUNT: CPT | Performed by: NURSE PRACTITIONER

## 2022-01-01 PROCEDURE — 25000003 PHARM REV CODE 250

## 2022-01-01 PROCEDURE — 80053 COMPREHEN METABOLIC PANEL: CPT | Performed by: EMERGENCY MEDICINE

## 2022-01-01 PROCEDURE — S0073 INJECTION, AZTREONAM, 500 MG: HCPCS | Performed by: INTERNAL MEDICINE

## 2022-01-01 PROCEDURE — 99900035 HC TECH TIME PER 15 MIN (STAT)

## 2022-01-01 PROCEDURE — 84484 ASSAY OF TROPONIN QUANT: CPT | Performed by: NURSE PRACTITIONER

## 2022-01-01 PROCEDURE — 63600175 PHARM REV CODE 636 W HCPCS: Performed by: EMERGENCY MEDICINE

## 2022-01-01 PROCEDURE — 96375 TX/PRO/DX INJ NEW DRUG ADDON: CPT

## 2022-01-01 PROCEDURE — 96365 THER/PROPH/DIAG IV INF INIT: CPT

## 2022-01-01 PROCEDURE — 99291 CRITICAL CARE FIRST HOUR: CPT | Mod: 25

## 2022-01-01 PROCEDURE — 31500 INSERT EMERGENCY AIRWAY: CPT | Mod: ,,, | Performed by: EMERGENCY MEDICINE

## 2022-01-01 PROCEDURE — 94761 N-INVAS EAR/PLS OXIMETRY MLT: CPT

## 2022-01-01 PROCEDURE — 36600 WITHDRAWAL OF ARTERIAL BLOOD: CPT

## 2022-01-01 PROCEDURE — S0030 INJECTION, METRONIDAZOLE: HCPCS | Performed by: EMERGENCY MEDICINE

## 2022-01-01 PROCEDURE — 99291 PR CRITICAL CARE, E/M 30-74 MINUTES: ICD-10-PCS | Mod: 25,GC,CS, | Performed by: EMERGENCY MEDICINE

## 2022-01-01 PROCEDURE — 85007 BL SMEAR W/DIFF WBC COUNT: CPT | Performed by: EMERGENCY MEDICINE

## 2022-01-01 PROCEDURE — 27200966 HC CLOSED SUCTION SYSTEM

## 2022-01-01 PROCEDURE — 87186 SC STD MICRODIL/AGAR DIL: CPT | Mod: 59 | Performed by: EMERGENCY MEDICINE

## 2022-01-01 PROCEDURE — 99291 CRITICAL CARE FIRST HOUR: CPT | Mod: 25,GC,CS, | Performed by: EMERGENCY MEDICINE

## 2022-01-01 PROCEDURE — 85610 PROTHROMBIN TIME: CPT | Performed by: STUDENT IN AN ORGANIZED HEALTH CARE EDUCATION/TRAINING PROGRAM

## 2022-01-01 PROCEDURE — 84100 ASSAY OF PHOSPHORUS: CPT | Performed by: NURSE PRACTITIONER

## 2022-01-01 PROCEDURE — 87040 BLOOD CULTURE FOR BACTERIA: CPT | Mod: 59 | Performed by: EMERGENCY MEDICINE

## 2022-01-01 PROCEDURE — 12000002 HC ACUTE/MED SURGE SEMI-PRIVATE ROOM

## 2022-01-01 PROCEDURE — 87077 CULTURE AEROBIC IDENTIFY: CPT | Mod: 59 | Performed by: EMERGENCY MEDICINE

## 2022-01-01 RX ORDER — ETOMIDATE 2 MG/ML
INJECTION INTRAVENOUS
Status: COMPLETED
Start: 2022-01-01 | End: 2022-01-01

## 2022-01-01 RX ORDER — METRONIDAZOLE 500 MG/100ML
500 INJECTION, SOLUTION INTRAVENOUS
Status: COMPLETED | OUTPATIENT
Start: 2022-01-01 | End: 2022-01-01

## 2022-01-01 RX ORDER — TIMOLOL MALEATE 5 MG/ML
1 SOLUTION/ DROPS OPHTHALMIC 2 TIMES DAILY
Status: DISCONTINUED | OUTPATIENT
Start: 2022-01-01 | End: 2022-01-01 | Stop reason: HOSPADM

## 2022-01-01 RX ORDER — PHENYLEPHRINE HCL IN 0.9% NACL 1 MG/10 ML
SYRINGE (ML) INTRAVENOUS
Status: COMPLETED
Start: 2022-01-01 | End: 2022-01-01

## 2022-01-01 RX ORDER — SODIUM BICARBONATE 1 MEQ/ML
100 SYRINGE (ML) INTRAVENOUS
Status: COMPLETED | OUTPATIENT
Start: 2022-01-01 | End: 2022-01-01

## 2022-01-01 RX ORDER — INDOMETHACIN 25 MG/1
50 CAPSULE ORAL ONCE
Status: COMPLETED | OUTPATIENT
Start: 2022-01-01 | End: 2022-01-01

## 2022-01-01 RX ORDER — CIPROFLOXACIN 2 MG/ML
400 INJECTION, SOLUTION INTRAVENOUS
Status: COMPLETED | OUTPATIENT
Start: 2022-01-01 | End: 2022-01-01

## 2022-01-01 RX ORDER — SODIUM CHLORIDE 0.9 % (FLUSH) 0.9 %
10 SYRINGE (ML) INJECTION
Status: DISCONTINUED | OUTPATIENT
Start: 2022-01-01 | End: 2022-01-01 | Stop reason: HOSPADM

## 2022-01-01 RX ORDER — NOREPINEPHRINE BITARTRATE/D5W 4MG/250ML
PLASTIC BAG, INJECTION (ML) INTRAVENOUS
Status: COMPLETED
Start: 2022-01-01 | End: 2022-01-01

## 2022-01-01 RX ORDER — FENTANYL CITRATE-0.9 % NACL/PF 10 MCG/ML
0-250 PLASTIC BAG, INJECTION (ML) INTRAVENOUS CONTINUOUS
Status: DISCONTINUED | OUTPATIENT
Start: 2022-01-01 | End: 2022-01-01 | Stop reason: HOSPADM

## 2022-01-01 RX ORDER — MORPHINE SULFATE 2 MG/ML
2 INJECTION, SOLUTION INTRAMUSCULAR; INTRAVENOUS ONCE
Status: COMPLETED | OUTPATIENT
Start: 2022-01-01 | End: 2022-01-01

## 2022-01-01 RX ORDER — SODIUM BICARBONATE 1 MEQ/ML
SYRINGE (ML) INTRAVENOUS
Status: DISCONTINUED
Start: 2022-01-01 | End: 2022-01-01 | Stop reason: HOSPADM

## 2022-01-01 RX ORDER — ROCURONIUM BROMIDE 10 MG/ML
INJECTION, SOLUTION INTRAVENOUS
Status: DISCONTINUED
Start: 2022-01-01 | End: 2022-01-01 | Stop reason: WASHOUT

## 2022-01-01 RX ORDER — PHENYLEPHRINE HCL IN 0.9% NACL 1 MG/10 ML
100 SYRINGE (ML) INTRAVENOUS EVERY 5 MIN PRN
Status: DISCONTINUED | OUTPATIENT
Start: 2022-01-01 | End: 2022-01-01 | Stop reason: HOSPADM

## 2022-01-01 RX ORDER — NOREPINEPHRINE BITARTRATE/D5W 4MG/250ML
0-3 PLASTIC BAG, INJECTION (ML) INTRAVENOUS CONTINUOUS
Status: DISCONTINUED | OUTPATIENT
Start: 2022-01-01 | End: 2022-01-01

## 2022-01-01 RX ORDER — SODIUM BICARBONATE 1 MEQ/ML
SYRINGE (ML) INTRAVENOUS
Status: COMPLETED
Start: 2022-01-01 | End: 2022-01-01

## 2022-01-01 RX ORDER — METRONIDAZOLE 500 MG/100ML
500 INJECTION, SOLUTION INTRAVENOUS
Status: DISCONTINUED | OUTPATIENT
Start: 2022-01-01 | End: 2022-01-01 | Stop reason: HOSPADM

## 2022-01-01 RX ORDER — FLUDROCORTISONE ACETATE 0.1 MG/1
100 TABLET ORAL DAILY
Status: DISCONTINUED | OUTPATIENT
Start: 2022-01-01 | End: 2022-01-01 | Stop reason: HOSPADM

## 2022-01-01 RX ORDER — NOREPINEPHRINE BITARTRATE/D5W 8 MG/250ML
0-3 PLASTIC BAG, INJECTION (ML) INTRAVENOUS CONTINUOUS
Status: DISCONTINUED | OUTPATIENT
Start: 2022-01-01 | End: 2022-01-01 | Stop reason: HOSPADM

## 2022-01-01 RX ORDER — DEXMEDETOMIDINE HYDROCHLORIDE 4 UG/ML
0-1.4 INJECTION, SOLUTION INTRAVENOUS CONTINUOUS
Status: DISCONTINUED | OUTPATIENT
Start: 2022-01-01 | End: 2022-01-01

## 2022-01-01 RX ORDER — INDOMETHACIN 25 MG/1
100 CAPSULE ORAL ONCE
Status: COMPLETED | OUTPATIENT
Start: 2022-01-01 | End: 2022-01-01

## 2022-01-01 RX ORDER — ROCURONIUM BROMIDE 10 MG/ML
40 INJECTION, SOLUTION INTRAVENOUS
Status: COMPLETED | OUTPATIENT
Start: 2022-01-01 | End: 2022-01-01

## 2022-01-01 RX ORDER — FAMOTIDINE 10 MG/ML
20 INJECTION INTRAVENOUS DAILY
Status: DISCONTINUED | OUTPATIENT
Start: 2022-01-01 | End: 2022-01-01 | Stop reason: HOSPADM

## 2022-01-01 RX ORDER — BRIMONIDINE TARTRATE, TIMOLOL MALEATE 2; 5 MG/ML; MG/ML
1 SOLUTION/ DROPS OPHTHALMIC 2 TIMES DAILY
Qty: 5 ML | Refills: 2 | OUTPATIENT
Start: 2022-01-01

## 2022-01-01 RX ORDER — ROCURONIUM BROMIDE 10 MG/ML
INJECTION, SOLUTION INTRAVENOUS
Status: COMPLETED
Start: 2022-01-01 | End: 2022-01-01

## 2022-01-01 RX ORDER — PHENYLEPHRINE HCL IN 0.9% NACL 1 MG/10 ML
100 SYRINGE (ML) INTRAVENOUS DAILY PRN
Status: DISCONTINUED | OUTPATIENT
Start: 2022-01-01 | End: 2022-01-01

## 2022-01-01 RX ORDER — ETOMIDATE 2 MG/ML
10 INJECTION INTRAVENOUS
Status: COMPLETED | OUTPATIENT
Start: 2022-01-01 | End: 2022-01-01

## 2022-01-01 RX ORDER — BRIMONIDINE TARTRATE 2 MG/ML
1 SOLUTION/ DROPS OPHTHALMIC 2 TIMES DAILY
Status: DISCONTINUED | OUTPATIENT
Start: 2022-01-01 | End: 2022-01-01 | Stop reason: HOSPADM

## 2022-01-01 RX ORDER — BRIMONIDINE TARTRATE AND TIMOLOL MALEATE 2; 5 MG/ML; MG/ML
1 SOLUTION OPHTHALMIC 2 TIMES DAILY
Status: DISCONTINUED | OUTPATIENT
Start: 2022-01-01 | End: 2022-01-01

## 2022-01-01 RX ORDER — HEPARIN SODIUM 5000 [USP'U]/ML
5000 INJECTION, SOLUTION INTRAVENOUS; SUBCUTANEOUS EVERY 8 HOURS
Status: DISCONTINUED | OUTPATIENT
Start: 2022-01-01 | End: 2022-01-01 | Stop reason: HOSPADM

## 2022-01-01 RX ADMIN — METRONIDAZOLE 500 MG: 500 INJECTION, SOLUTION INTRAVENOUS at 02:01

## 2022-01-01 RX ADMIN — TIMOLOL MALEATE 1 DROP: 5 SOLUTION OPHTHALMIC at 10:01

## 2022-01-01 RX ADMIN — NOREPINEPHRINE BITARTRATE 0.8 MCG/KG/MIN: 4 INJECTION, SOLUTION INTRAVENOUS at 01:01

## 2022-01-01 RX ADMIN — SODIUM CHLORIDE, SODIUM LACTATE, POTASSIUM CHLORIDE, AND CALCIUM CHLORIDE 1158 ML: .6; .31; .03; .02 INJECTION, SOLUTION INTRAVENOUS at 04:01

## 2022-01-01 RX ADMIN — AZTREONAM 1000 MG: 1 INJECTION, POWDER, LYOPHILIZED, FOR SOLUTION INTRAMUSCULAR; INTRAVENOUS at 04:01

## 2022-01-01 RX ADMIN — Medication 100 MCG: at 04:01

## 2022-01-01 RX ADMIN — MORPHINE SULFATE 2 MG: 2 INJECTION, SOLUTION INTRAMUSCULAR; INTRAVENOUS at 03:01

## 2022-01-01 RX ADMIN — ROCURONIUM BROMIDE 40 MG: 10 INJECTION, SOLUTION INTRAVENOUS at 04:01

## 2022-01-01 RX ADMIN — NOREPINEPHRINE BITARTRATE 0.05 MCG/KG/MIN: 4 INJECTION, SOLUTION INTRAVENOUS at 04:01

## 2022-01-01 RX ADMIN — SODIUM BICARBONATE 100 MEQ: 84 INJECTION INTRAVENOUS at 05:01

## 2022-01-01 RX ADMIN — VASOPRESSIN 0.04 UNITS/MIN: 20 INJECTION INTRAVENOUS at 07:01

## 2022-01-01 RX ADMIN — METRONIDAZOLE 500 MG: 500 INJECTION, SOLUTION INTRAVENOUS at 05:01

## 2022-01-01 RX ADMIN — ETOMIDATE 10 MG: 2 INJECTION INTRAVENOUS at 04:01

## 2022-01-01 RX ADMIN — Medication: at 03:01

## 2022-01-01 RX ADMIN — BIMATOPROST 1 DROP: 0.1 SOLUTION/ DROPS OPHTHALMIC at 10:01

## 2022-01-01 RX ADMIN — VASOPRESSIN 0.04 UNITS/MIN: 20 INJECTION INTRAVENOUS at 03:01

## 2022-01-01 RX ADMIN — CIPROFLOXACIN 400 MG: 2 INJECTION, SOLUTION INTRAVENOUS at 05:01

## 2022-01-01 RX ADMIN — SODIUM BICARBONATE 50 MEQ: 84 INJECTION INTRAVENOUS at 06:01

## 2022-01-01 RX ADMIN — HYDROCORTISONE SODIUM SUCCINATE 100 MG: 100 INJECTION, POWDER, FOR SOLUTION INTRAMUSCULAR; INTRAVENOUS at 01:01

## 2022-01-01 RX ADMIN — VANCOMYCIN HYDROCHLORIDE 750 MG: 750 INJECTION, POWDER, LYOPHILIZED, FOR SOLUTION INTRAVENOUS at 07:01

## 2022-01-01 RX ADMIN — FLUDROCORTISONE ACETATE 100 MCG: 0.1 TABLET ORAL at 07:01

## 2022-01-01 RX ADMIN — INDOMETHACIN 50 MEQ: 25 CAPSULE ORAL at 06:01

## 2022-01-01 RX ADMIN — BRIMONIDINE TARTRATE 1 DROP: 2 SOLUTION OPHTHALMIC at 10:01

## 2022-01-01 RX ADMIN — Medication 25 MCG/HR: at 10:01

## 2022-01-01 RX ADMIN — NOREPINEPHRINE BITARTRATE 1 MCG/KG/MIN: 8 INJECTION, SOLUTION INTRAVENOUS at 04:01

## 2022-01-01 RX ADMIN — SODIUM BICARBONATE 100 MEQ: 84 INJECTION INTRAVENOUS at 03:01

## 2022-01-10 NOTE — TELEPHONE ENCOUNTER
----- Message from Rolanda Luque sent at 1/10/2022  2:35 PM CST -----  Regarding: order  Contact: Urszula Vergara daughter [phone]  Daughter is requesting information on getting a sitter for patient.  Please call and advise.

## 2022-01-10 NOTE — TELEPHONE ENCOUNTER
----- Message from Rolanda Luque sent at 1/10/2022  2:39 PM CST -----  Regarding: refill reqeust  Contact: Urszula Vergara daughter [phone]  Requesting an RX refill or new RX.  Is this a refill or new RX: refill  RX name and strength brimonidine-timoloL (COMBIGAN) 0.2-0.5 % Drop  Is this a 30 day or 90 day RX: 90  Patient advised that in the future they can use their [facility] account to request a refill?:  refill  Pharmacy name and phone #  [id number] [address] [facility]  [address] [facility] [address]  Phone: [phone] Fax: [phone]     Comments: Need interim refill until 's appointment in April

## 2022-01-14 PROBLEM — R57.9 SHOCK: Status: ACTIVE | Noted: 2022-01-01

## 2022-01-14 PROBLEM — R41.82 ALTERED MENTAL STATUS: Status: ACTIVE | Noted: 2022-01-01

## 2022-01-14 NOTE — ED TRIAGE NOTES
Altered Mental Status (Family states pt was talking and responding yesterday. Pt is responsive to painful stimuli only at this time. )

## 2022-01-14 NOTE — TELEPHONE ENCOUNTER
----- Message from Jennifer Sanchez sent at 1/14/2022  3:55 PM CST -----  Contact: [phone]  Patient would like to get medical advice.  Daughter Urszula Vergara is calling to notify staff that her mother Marlene Vergara is being taken to [facility] by ambulance. Irregular hearbeat.

## 2022-01-14 NOTE — ED NOTES
Patient identifiers verified and correct for Marlene Vergara  LOC: The patient responds to painful stimuli only  APPEARANCE: Patient appears comfortable and in no acute distress, patient is clean and well groomed.  SKIN: The skin is warm and dry, color consistent with ethnicity, patient has normal skin turgor and moist mucus membranes, skin intact, no breakdown or bruising noted.    MUSCULOSKELETAL: Patient is not responding.  RESPIRATORY: Airway is open and patent  CARDIAC: Pt placed on cardiac monitor. Patient has a normal rate and regular rhythm, no edema noted, capillary refill < 3 seconds.   GASTRO: Soft and non tender to palpation, no distention noted  : Unable to assess  NEURO: Pt responds to painful stimuli only

## 2022-01-14 NOTE — ED PROVIDER NOTES
Encounter Date: 1/14/2022       History     Chief Complaint   Patient presents with    Altered Mental Status     Family states pt was talking and responding yesterday. Pt is responsive to painful stimuli only at this time.      Ms. Vergara is a 89 yo w/ dementia, hypertension, coronary artery disease of native artery, tortuous aorta, hyperglycemia, severe protein-energy malnutrition, contractures of both knees, cachexia, hypoproteinemia, syncope, and pressure injury of sacral region stage 2 presenting from EMS for concerns of AMS. Patient's daughter Urszula Vergara provides the history due to the acuity of the patient's condition. Reports that she was giving her liquids by mouth when the patient started to gargle and wasn't able to swallow and spit up the fluid. She says the patient was still smiling at this time. She says the patient had been her normal self and baseline prior to this, she had been speaking on the phone yesterday. Urszula reports the patient's granddaughter is a dentist who was present and noticed that she was breathing faster and appeared more labored and she called the ambulance. Urszula does report that the patient had diarrhea that began today. Urszula denies the patient reporting complaints including fevers, chills, chest pains, abdominal pains, headaches, or falls/trauma.     Per chart review, patient had been seen by Palliative Care outpatient and discussions regarding transition to hospice were initiated.  The family wanted to continue to pursue all options.     The history is provided by a relative, medical records and the EMS personnel.     Review of patient's allergies indicates:   Allergen Reactions    Pcn [penicillins] Anaphylaxis     Past Medical History:   Diagnosis Date    FH: cataracts     Glaucoma of both eyes     Hypertension      Past Surgical History:   Procedure Laterality Date    ANGIOPLASTY      EYE SURGERY      TONSILLECTOMY      TUMOR REMOVAL       Family History    Problem Relation Age of Onset    Lung cancer Son     No Known Problems Mother     No Known Problems Father     Asthma Daughter     No Known Problems Daughter     No Known Problems Daughter     No Known Problems Daughter     No Known Problems Son      Social History     Tobacco Use    Smoking status: Former Smoker     Years: 45.00     Types: Cigarettes     Quit date:      Years since quittin.0    Smokeless tobacco: Never Used   Substance Use Topics    Alcohol use: No    Drug use: No     Review of Systems   Unable to perform ROS: Acuity of condition       Physical Exam     Initial Vitals   BP Pulse Resp Temp SpO2   22 1559 22 1559 22 1559 22 1723 22 1643   100/62 70 (!) 22 (!) 94.1 °F (34.5 °C) (!) 64 %      MAP       --                Physical Exam    Nursing note and vitals reviewed.  Constitutional: She appears lethargic. She appears cachectic. She has a sickly appearance. She appears ill.   HENT:   Head: Normocephalic and atraumatic.   Eyes: Right eye exhibits no discharge. Left eye exhibits no discharge.   Neck: Neck supple.   Cardiovascular: Normal rate and regular rhythm.   Pulmonary/Chest: Accessory muscle usage present. Tachypnea noted. She is in respiratory distress.   Abdominal: Abdomen is soft and flat. She exhibits no distension.   Genitourinary:    Genitourinary Comments: Rectum and anus inspected w/ visible diarrhea in diaper  Anus w/ profuse serosanguinous and purulent material draining (please see picture in chart)     Musculoskeletal:         General: No edema.      Cervical back: Neck supple.      Comments: No edema or visible deformity of extremities     Neurological: She appears lethargic. GCS eye subscore is 3. GCS verbal subscore is 1. GCS motor subscore is 1.   Skin: Skin is dry.   Cold, dry extremities         ED Course   Intubation    Date/Time: 2022 7:10 PM  Location procedure was performed: [facility] EMERGENCY DEPARTMENT  Performed by:  Shaun Thompson MD  Authorized by: Shaun Thompson MD   Consent Done: Yes  Consent: Verbal consent obtained.  Risks and benefits: risks, benefits and alternatives were discussed  Consent given by: Daughter.  Patient identity confirmed:  and MRN  Intubation method: direct  Preoxygenation: nonrebreather mask and nasal cannula  Sedatives: etomidate  Paralytic: rocuronium  Laryngoscope size: Mac 4  Tube size: 7.5 mm  Tube type: cuffed  Number of attempts: 1  Cricoid pressure: yes  Cords visualized: yes  Post-procedure assessment: chest rise and CO2 detector  Breath sounds: equal  Cuff inflated: yes  ETT to lip: 23 cm  Tube secured with: ETT wilcox  Chest x-ray interpreted by radiologist.  Chest x-ray findings: endotracheal tube too low  Tube repositioned: tube repositioned successfully  Patient tolerance: Patient tolerated the procedure well with no immediate complications  Complications: No  Estimated blood loss (mL): 0  Specimens: No  Implants: No        Labs Reviewed   CBC W/ AUTO DIFFERENTIAL - Abnormal; Notable for the following components:       Result Value    WBC 17.91 (*)      (*)     MCHC 28.8 (*)     RDW 14.8 (*)     Platelets 129 (*)     Gran % 83.0 (*)     Lymph % 3.0 (*)     Mono % 1.0 (*)     Platelet Estimate Decreased (*)     All other components within normal limits   COMPREHENSIVE METABOLIC PANEL - Abnormal; Notable for the following components:    CO2 12 (*)     BUN 37 (*)     Creatinine 2.1 (*)     Albumin 2.3 (*)     Alkaline Phosphatase 139 (*)      (*)      (*)     Anion Gap 21 (*)     eGFR if  23.4 (*)     eGFR if non  20.3 (*)     All other components within normal limits   LACTIC ACID, PLASMA - Abnormal; Notable for the following components:    Lactate (Lactic Acid) >12.0 (*)     All other components within normal limits   LACTIC ACID, PLASMA - Abnormal; Notable for the following components:    Lactate (Lactic Acid) 11.3 (*)     All  other components within normal limits   URINALYSIS, REFLEX TO URINE CULTURE - Abnormal; Notable for the following components:    Appearance, UA Cloudy (*)     Protein, UA 2+ (*)     Ketones, UA Trace (*)     Occult Blood UA 1+ (*)     Leukocytes, UA 2+ (*)     All other components within normal limits    Narrative:     Specimen Source->Urine   TSH - Abnormal; Notable for the following components:    TSH 4.702 (*)     All other components within normal limits   TROPONIN I - Abnormal; Notable for the following components:    Troponin I 0.227 (*)     All other components within normal limits   URINALYSIS, REFLEX TO URINE CULTURE - Abnormal; Notable for the following components:    Appearance, UA Cloudy (*)     Protein, UA 2+ (*)     Ketones, UA Trace (*)     Occult Blood UA 1+ (*)     Leukocytes, UA 2+ (*)     All other components within normal limits    Narrative:     Specimen Source->Urine   URINALYSIS MICROSCOPIC - Abnormal; Notable for the following components:    RBC, UA 8 (*)     WBC, UA >100 (*)     Bacteria Many (*)     Amorphous, UA Many (*)     All other components within normal limits    Narrative:     Specimen Source->Urine   ISTAT PROCEDURE - Abnormal; Notable for the following components:    POC PH 7.001 (*)     POC PCO2 49.3 (*)     POC PO2 219 (*)     POC HCO3 12.2 (*)     POC TCO2 14 (*)     All other components within normal limits   CULTURE, BLOOD   CULTURE, BLOOD   CULTURE, RESPIRATORY   CULTURE, URINE   T4, FREE   TROPONIN I   SARS-COV-2 RDRP GENE     EKG Readings: (Independently Interpreted)   Rhythm: Normal Sinus Rhythm. Heart Rate: 75.   BALDOMERO in inferior leads  Discussed with Cardiology, no code STEMI   STD and TWI V2       Imaging Results          X-ray Abdomen for NG Tube Placement (Nursing should notify Radiology after placement) (Final result)  Result time 01/14/22 18:32:23    Final result by Kyle Fields MD (01/14/22 18:32:23)                 Impression:      Proximal port of the enteric  tube is at the gastroesophageal junction.  Recommend advancing the tube prior to use.    Electronically signed by resident: Alek Stewart  Date:    01/14/2022  Time:    18:24    Electronically signed by: Kyle Fields MD  Date:    01/14/2022  Time:    18:32             Narrative:    EXAMINATION:  XR NON-RADIOLOGIST PERFORMED NG/GASTRIC TUBE CHECK    CLINICAL HISTORY:  NGT;    TECHNIQUE:  AP View(s) of the abdomen was performed.    COMPARISON:  Chest radiograph 01/14/2022    FINDINGS:  Enteric tube terminates over the stomach.  Distal tip is below the gastroesophageal junction.  The proximal port is at the gastroesophageal junction.  Recommend advancing the tube prior to use.    Endotracheal tube terminates 3 cm away from the franklyn.    Tortuous appearing aorta.  Cardiac silhouette is within normal limits.  Partially visualized perihilar opacities better visualized on same-day chest radiograph.    Upper abdomen appears unremarkable.                               CT Head Without Contrast (No Result on File)                CT Chest Abdomen Pelvis Without Contrast (XPD) (No Result on File)                X-Ray Chest AP Portable (Final result)  Result time 01/14/22 17:17:39    Final result by Kyle Fields MD (01/14/22 17:17:39)                 Impression:      Bilateral perihilar airspace opacities, left worse than right.  Findings could be seen the setting of infection, inflammation, or less likely edema.    Endotracheal tube terminates 2 cm away from the franklyn.    Electronically signed by resident: Alek Stewart  Date:    01/14/2022  Time:    17:07    Electronically signed by: Kyle Fields MD  Date:    01/14/2022  Time:    17:17             Narrative:    EXAMINATION:  XR CHEST AP PORTABLE    CLINICAL HISTORY:  Sepsis;    TECHNIQUE:  Two sequential AP portable views of the chest, with 2nd radiographic evaluation approximately 3-4 minutes after initial radiograph    COMPARISON:  Chest radiograph 08/28/2021,  08/03/2021    FINDINGS:  Endotracheal tube is in place terminating approximately 2 cm away from the franklyn on the repeat AP image.  Cardiac monitoring leads overlie the chest.  Aorta appears tortuous.  Cardiac silhouette appears stable in size from prior exam.  Trachea and mediastinal structures appear midline noting patient rotation.    Elevation left hemidiaphragm.  Bilateral perihilar patchy airspace opacities, worse on the left.  No evidence of pneumothorax or pleural fluid.  No pneumoperitoneum.                                 Medications   phenylephrine HCl in 0.9% NaCl 1 mg/10 mL (100 mcg/mL) syringe 100 mcg (100 mcg Intravenous Given 1/14/22 1640)   NORepinephrine 4 mg in dextrose 5% 250 mL infusion (premix) (titrating) (0.8 mcg/kg/min × 38.6 kg Intravenous Rate/Dose Change 1/1935)   fentaNYL 2500 mcg in 0.9% sodium chloride 250 mL infusion premix (titrating) (25 mcg/hr Intravenous New Bag 1/14/22 2226)   sodium chloride 0.9% flush 10 mL (has no administration in time range)   vancomycin - pharmacy to dose (has no administration in time range)   metronidazole IVPB 500 mg (has no administration in time range)   bimatoprost 0.01 % ophthalmic drops 1 drop (has no administration in time range)   famotidine (PF) injection 20 mg (has no administration in time range)   vasopressin (PITRESSIN) 0.2 Units/mL in dextrose 5 % 100 mL infusion (0.04 Units/min Intravenous New Bag 1/14/22 1957)   hydrocortisone sodium succinate injection 100 mg (has no administration in time range)   fludrocortisone tablet 100 mcg (100 mcg Oral Given 1/14/22 1955)   dexmedetomidine (PRECEDEX) 400mcg/100mL 0.9% NaCL infusion (has no administration in time range)   heparin (porcine) injection 5,000 Units (has no administration in time range)   brimonidine 0.2% ophthalmic solution 1 drop (has no administration in time range)   timolol maleate 0.5% ophthalmic solution 1 drop (has no administration in time range)   lactated ringers bolus  "1,158 mL (0 mL/kg × 38.6 kg Intravenous Stopped 1/14/22 1659)   etomidate injection 10 mg (10 mg Intravenous Given 1/14/22 1631)   rocuronium injection 40 mg (40 mg Intravenous Given 1/14/22 1631)   sodium bicarbonate 8.4 % (1 mEq/mL) injection 100 mEq (100 mEq Intravenous Given 1/14/22 1707)   ciprofloxacin (CIPRO)400mg/200ml D5W IVPB 400 mg (0 mg Intravenous Stopped 1/14/22 1907)   metronidazole IVPB 500 mg (0 mg Intravenous Stopped 1/14/22 1841)   sodium bicarbonate solution 50 mEq (50 mEq Intravenous Given 1/14/22 1806)   vancomycin 750 mg in dextrose 5 % 250 mL IVPB (ready to mix system) ( Intravenous Stopped 1/14/22 2039)     Medical Decision Making:   History:   I obtained history from: EMS provider.  Old Medical Records: I decided to obtain old medical records.  Initial Assessment:   89 yo F p/w acute mental status change and acute hypoxia. Hypothermic, hypotensive 100/62, tachypneic, and hypoxic. Cachetic, ill-appearing.   Differential Diagnosis:   Including, not limited to:  Ddx including but not limited to Sepsis vs. ACS vs. Stroke vs. Aspiration Pneumonia vs. ICH  Independently Interpreted Test(s):   I have ordered and independently interpreted EKG Reading(s) - see prior notes  Clinical Tests:   Lab Tests: Ordered and Reviewed       <> Summary of Lab: iSTAT ABG w/ metabolic acidosis   Radiological Study: Ordered and Reviewed  Medical Tests: Ordered and Reviewed  Sepsis Perfusion Assessment: "I attest a sepsis perfusion exam was performed within 6 hours of sepsis, severe sepsis, or septic shock presentation, following fluid resuscitation."  ED Management:  Discussed patient's current critical condition to pt's daughter & HCPOA Urszula Jai at bedside. Explained to Urszula that patient was currently unstable. Urszula reports that patient would like all measures to be done including intubation and chest compressions.    Patient hypotensive w/ cold extremities, started on levophed gtt and IV pushes of " phenylephrine.  Patient with continued hypoxia with SpO2 65-75% on nonrebreather, tachypnea and accessory muscle use. Urszula provided verbal consent for intubation. Intubation performed by Dr. Thompson.  Cardiology consulted on arrival to ED due to EKG obtained en route by EMS concerning for STEMI; Cardiology evaluated patient at bedside and was determined that given patient's current critical status that she would not currently be a candidate for cath lab intervention.   Labs ordered including iSTAT Chem-8, CBC, CMP, lactate, ABG, TSH, troponin, blood cultures  -- ABG w/ metabolic acidosis and bicarb 12.2  -- CBC w/ significant leukocytosis 17.9  -- CMP w/ transaminitis, decreased bicarb, Cr elevated above bl indicating NORAH  -- Initial lactate greater than 12, repeat lactate 11  -- Troponin elevated  -- TSH elevated w/ normal T4  -- Labs concerning for multiorgan failure and septic shock  2 amps bicarb ordered and adminstered  Fluid resuscitated 30cc/kg   Antibiotics started: metronidazole 500mg and ciprofloxacin 400mg  Bairhugger placed on patient for hypothermia  CTH ordered, pending  CT C/A/P ordered, pending  Critical care evaluated patient at bedside and admitting to ICU  Other:   I have discussed this case with another health care provider.            Attending Attestation:   Physician Attestation Statement for Resident:  As the supervising MD   Physician Attestation Statement: I have personally seen and examined this patient.   I agree with the above history. -: 89-year-old female presenting with change in mental status, reportedly seen at baseline yesterday.  No reported falls.    History supplemented by EMS and the patient's daughter, Urszula, present at bedside.  Patient has no LaPOST on file.  She is full code, per documentation, confirmed by Urszula.   As the supervising MD I agree with the above PE.   -: Cachectic  Tachypneic  Bibasilar rales  No peripheral edema  Abdomen soft, not distended     As the  supervising MD I agree with the above treatment, course, plan, and disposition.   -: Had a lengthy discussion with the patient's daughter on arrival regarding risks and benefits of intubation.  Her daughter is aware that her prognosis is grave and consented to intubation.    Patient was intubated for for airway protection.    She is persistently hypotensive, raising suspicion for septic shock.  She received a 30 cc/kilos bolus of IV fluids, as well as antibiotics.  Difficulty obtaining adequate pulse ox reading, readings >93% on portable device.      Cardiology consulted on patient's arrival, pre-hospital EKG with concern for STEMI.  Cardiology agrees, cath lab would not be indicated at this time given her clinical status.  Patient's workup concerning for multiorgan dysfunction, including abnormal LFTs, NORAH, elevated troponin.  She was noted to be hypothermic and placed on a Nakul Hugger.    ICU consulted and patient accepted for admission.  CT head/chest/abdomen and pelvis pending at time of admission to ICU.  I have reviewed and agree with the residents interpretation of the following: lab data, x-rays, EKG and CT scans.  I have reviewed the following: old records at this facility.        Attending Critical Care:   Critical Care Times:   Direct Patient Care (initial evaluation, reassessments, and time considering the case)................................................................17 minutes.   Additional History from reviewing old medical records or taking additional history from the family, EMS, PCP, etc.......................4 minutes.   Ordering, Reviewing, and Interpreting Diagnostic Studies...............................................................................................................6 minutes.   Documentation..................................................................................................................................................................................8  minutes.   Consultation with other Physicians. .................................................................................................................................................7 minutes.   Consultation with the patient's family directly relating to the patient's condition, care, and DNR status (when patient unable)......12 minutes.   ==============================================================  · Total Critical Care Time - exclusive of procedural time: 54 minutes.  ==============================================================  Critical care was necessary to treat or prevent imminent or life-threatening deterioration of the following conditions: airway compromise.   The following critical care procedures were done by me (see procedure notes): airway management.   Critical care was time spent personally by me on the following activities: obtaining history from patient or relative, examination of patient, review of old charts, ordering lab, x-rays, and/or EKG, development of treatment plan with patient or relative, evaluation of patient's response to treatment, discussion with consultants, re-evaluation of patient's conition, ventilator management and end of life discussions.   Critical Care Condition: critical           ED Course as of 01/14/22 2235 Fri Jan 14, 2022   1647 Cardiology has evaluated the patient, recommend admission to medical ICU, no acute intervention at this time. [AB]   1706 Left upper extremity IV infiltrated.  Levophed changed to right upper extremity IV. [AB]   1707 Critical care team at bedside.  [AB]   1707 POC PH(!!): 7.001 [AB]   1707 POC PO2(!): 219 [AB]   1707 Pulse oximetry reading difficult to obtain, suspect this is 2/2 hypotension.  Based on ABG, patient is oxygenating post-intubation.  [AB]   1850 WBC(!): 17.91  Leukocytosis  [AB]   1859 Alkaline Phosphatase(!): 139 [AB]   1859 AST(!): 158 [AB]   1859 ALT(!): 127 [AB]   1859 Anion Gap(!): 21 [AB]   1859 Lactate,  Brendan(!!): >12.0 [AB]   1900 Creatinine(!): 2.1  NORAH, baseline 0.7 [AB]   1955 WBC, UA(!): >100  C/w infection  [AB]      ED Course User Index  [AB] Shaun Thompson MD             Clinical Impression:   Final diagnoses:  [R46.89] Altered behavior  [Z46.59] Encounter for orogastric (OG) tube placement  [A41.9, R65.21] Septic shock (Primary)  [E87.2] Lactic acidosis  [A41.9, R65.20] Sepsis with multiple organ dysfunction (MOD)  [D72.829] Leukocytosis, unspecified type  [N17.9] NORAH (acute kidney injury)          ED Disposition Condition    Admit               Kamron Oliveira DO  Resident  01/14/22 2121       Shaun Thompson MD  01/14/22 [id number]

## 2022-01-14 NOTE — CONSULTS
Patient seen and evaluated by critical care medicine. To be admitted to the intensive care unit. Full H&P to follow.     JONY Hobson  Pulmonary Critical Care Medicine   01/14/2022

## 2022-01-14 NOTE — PLAN OF CARE
Brief Cardiology Consult Note    Consulted regarding possible ST elevations in Ms. Vergara 89 y F presenting w AMS. Has HTN but no other known hx cardiac disease. Responsive only to painful stimuli. Now intubated. Initial EMS ECG reviewed; inferior nonspecific ST changes, Repeat ECGs without ischemic changes. Started on levophed for shock of unclear etiology. Bedside TTE w low-normal LVEF, normal RV function. No known recent chest pain, shortness of breath or other cardiac symptoms per daughter. Recommend continued workup and management of possible stroke and shock (currently on levophed) per EM MICU.    Case discussed with attending, Dr. Dejesus.    Thank you for this interesting consult. Cardiology consult will sign off. Please call with questions as needed.        Chevy Torres MD  PGY-6, Cardiology  Pager [phone]

## 2022-01-15 PROBLEM — N20.1 URETERAL STONE: Status: ACTIVE | Noted: 2022-01-01

## 2022-01-15 NOTE — DISCHARGE SUMMARY
"[facility] - Cardiac Intensive Care  Critical Care Medicine  Discharge Summary      Patient Name: Marlene Vergara  MRN: [id number]  Admission Date: 1/14/2022  Hospital Length of Stay: 1 days  Discharge Date and Time:  01/15/2022 6:45 AM  Attending Physician: Yue Smith MD   Discharging Provider: Abel Edouard MD  Primary Care Provider: Xiomara Edwards MD  Reason for Admission: Septic shock    HPI:   Mrs. Vergara is a 89 year old female with PMH notable for HTN, open angle glaucoma, CAD on ASA, and dementia who [place] [facility] [place] for altered mental status. History obtained from daughter at bedside. Per daughter, patient was reportedly at baseline earlier today when she was attempting to "hydrate her" with "water, peppermint oil, and cinnamon." She reports that patient would open her eyes to command but would not swallow water without instruction. She reports that she then began to rub patient chest and throat while closing her mouth to facilitate swallowing. She then reports worsening mental status which prompted her to call EMS. She denies any coughing or vomiting after this occurred.     Upon arrival to [facility] she was obtunded and only responsive to verbal stimuli. She was a GCS 3. Goals of care were discussed at bedside and the decision was made to proceed with intubation. Patient profoundly hypotensive, given 1.1L IVF, and then started on vasopressor support. Labs were difficult to obtain due to poor vasculature, but initial ABG 7.00/49/219/12. She was given 2 amps of sodium bicarb. Her rectal temperature was 94 degrees and she was placed on a warming blanket. Initial EKG concerning for ischemic changes. Was evaluated by cardiology and ECG showed inferior non specific ST changes, however, repeat ECG without any ischemic changes. Bedside U/S with low-normal LVEF and normal RV function, per cardiology. She was given IV Cipro and Flagyl.     Critical care consulted for undifferentiated shock. "       Procedure(s) (LRB):  Creation, Nephrostomy, Percutaneous (Right) (not performed)    Indwelling Lines/Drains at Time of Discharge:   Lines/Drains/Airways     Central Venous Catheter Line            Percutaneous Central Line Insertion/Assessment - Triple Lumen  01/15/22 left internal jugular <1 day          Drain                 NG/OG Tube 01/14/22 1753 [place] sump 18 Fr. Center mouth <1 day         Urethral Catheter 01/14/22 1840 Temperature probe <1 day          Airway                 Airway - Non-Surgical 01/14/22 1633 Endotracheal Tube <1 day          Arterial Line            Arterial Line 01/15/22 0200 Right Radial <1 day              Hospital Course:   Patient admitted with septic shock secondary to urinary tract infection. Blood cultures and urine culture obtained. Fluid resuscitated in the ED but continued to have low blood pressures so she was started on vasopressors. Started on Aztreonam, flagyl and vancomycin empirically. Labs notable for significant metabolic acidosis secondary to lactic acid. She was given several amps of bicarbonate with little improvement and lactate continued to be >12. Per goals of care talk in the ED between Dr Davis and family,patient was made DNR, however the family was interested in pursuing all treatments. A Left IJ CVC was placed sucesfully, confirmed placement with CXR, as well as a L arterial line for hemodynamic monitoring. CT C/A/P with Left sided infiltrate suspicious for PNA and also noted to have R-sided mild hydronephrosis. Urology was consulted who recommended nephrostomy placement by IR. However, before patient was taken for the procedure she went into asystole. No CPR was given due to DNR status. Daughter at bedside, provided emotional support.       Consults (From admission, onward)        Status Ordering Provider     Inpatient consult to Interventional Radiology  Once        Provider:  (Not yet assigned)    Completed KHARI CARVALHO     Inpatient consult to  "Urology  Once        Provider:  (Not yet assigned)    Completed BAM MCNAIR     Pharmacy to dose Vancomycin consult  Once        Provider:  (Not yet assigned)   "And" Linked Group Details    Acknowledged KISHA LEON     Inpatient consult to Critical Care Medicine  Once        Provider:  (Not yet assigned)    Completed TATA JO        Significant Labs:  A1C: No results for input(s): HGBA1C in the last 48 hours.  ABGs:   Recent Labs   Lab 01/15/22  0311   PH 7.034*   PCO2 45.0   HCO3 12.0*   POCSATURATED 82*   BE -19     Blood Culture:   Recent Labs   Lab 01/14/22  1738   LABBLOO Gram stain yany bottle: Gram negative rods   Results called to and read back by:DR. PAULSON 01/15/2022  06:35  Gram stain yany bottle: Gram negative rods   Results called to and read back by:DR. PAULSON 01/15/2022  06:35     BMP:   Recent Labs   Lab 01/14/22  1738         K 4.4      CO2 12*   BUN 37*   CREATININE 2.1*   CALCIUM 9.6     CMP:   Recent Labs   Lab 01/14/22  1738      K 4.4      CO2 12*      BUN 37*   CREATININE 2.1*   CALCIUM 9.6   PROT 6.3   ALBUMIN 2.3*   BILITOT 0.4   ALKPHOS 139*   *   *   ANIONGAP 21*   EGFRNONAA 20.3*       Significant Imaging:  I have reviewed all pertinent imaging results/findings within the past 24 hours.  CT: I have reviewed all pertinent results/findings within the past 24 hours and my personal findings are:  R sided mild hydronephrosis    Pending Diagnostic Studies:     Procedure Component Value Units Date/Time    Comprehensive metabolic panel [[id number]] Collected: 01/15/22 0324    Order Status: Sent Lab Status: In process Updated: 01/15/22 0336    Specimen: Blood     IR Nephrostomy Tube Placement [[id number]]     Order Status: Sent Lab Status: No result     Magnesium [[id number]] Collected: 01/15/22 0324    Order Status: Sent Lab Status: In process Updated: 01/15/22 0336    Specimen: Blood     Phosphorus [[id number]] " Collected: 01/15/22 0324    Order Status: Sent Lab Status: In process Updated: 01/15/22 0336    Specimen: Blood         Final Active Diagnoses:    Diagnosis Date Noted POA    Ureteral stone [N20.1] 01/15/2022 Yes    Altered mental status [R41.82] 2022 Yes    Shock [R57.9] 2022 Yes    Pressure injury of sacral region, stage 2 [L89.152] 2021 Yes    Cachexia [R64] 2021 Yes    HTN (hypertension) [I10] 2014 Yes    Primary open angle glaucoma of both eyes [H40.1130] 2014 Yes      Problems Resolved During this Admission:     No new Assessment & Plan notes have been filed under this hospital service since the last note was generated.  Service: Critical Care Medicine    Discharged Condition:     Disposition: erica      Patient Instructions:   No discharge procedures on file.  Medications:  None     Abel Edouard MD  Critical Care Medicine  [facility] [address] [facility] - Cardiac Intensive Care

## 2022-01-15 NOTE — NURSING
IR RN and anesthesiologist in room to transport pt to IR for procedure when pt went asystole BP on art line 12/6. CC team Dr. Aldana called and requested his presence. No CPR was performed due to pt's DNR status. Daughter Urszula at bedside. Emotional support given.

## 2022-01-15 NOTE — PROCEDURES
"Marlene Vergara is a 89 y.o. female patient.    Temp: 99.32 °F (37.4 °C) (01/15/22 0215)  Pulse: 97 (01/15/22 0215)  Resp: (!) 30 (01/15/22 0215)  BP: (!) 91/56 (01/15/22 0215)  SpO2: (!) 54 % (01/15/22 0030)  Weight: 38.6 kg (85 lb) (01/14/22 1559)  Height: 5' 3" (160 cm) (01/14/22 1559)       Central Line    Date/Time: 1/15/2022 2:54 AM  Performed by: Abel Edouard MD  Authorized by: Abel Edouard MD     Location procedure was performed:  [facility] CRITICAL CARE MEDICINE  Pre-operative diagnosis:  Septic shock  Post-operative diagnosis:  Septic shock  Consent Done ?:  Yes  Indications:  Med administration and vascular access  Anesthesia:  Local infiltration  Local anesthetic:  Lidocaine 2% without epinephrine  Preparation:  Skin prepped with ChloraPrep  Location:  Left internal jugular  Catheter type:  Triple lumen  Catheter size:  7 Fr  Manometry: Yes    Number of attempts:  1  Securement:  Line sutured, chlorhexidine patch, sterile dressing applied and blood return through all ports  Complications: No    Estimated blood loss (mL):  5  Specimens: No    Implants: No          1/15/2022       Abel Aldana MD.  Internal Medicine PGY-3    "

## 2022-01-15 NOTE — SIGNIFICANT EVENT
Critical Care Medicine                                                                                       Death Note      Called to bedside by patient's nurse. Nursing supervisor notified. Family at bedside.     Patient is not responding to verbal or tactile stimuli. Patient does not have a papillary or corneal reflex. Her pupils are fixed and dilated. No heart or breath sounds on auscultation. No respirations. No palpable pulses.     Time of death: 06:30 AM    Cause of Death: Septic shock    Abel Edouard MD  [phone]  [email or web address]

## 2022-01-15 NOTE — ASSESSMENT & PLAN NOTE
Cachectic 89 year old female with bilateral upper and lower extremity contractures.     -- Consult palliative care in AM. Ongoing goals of care conversations   -- If remains intubated, nutrition consult for tube feed recommendations

## 2022-01-15 NOTE — ASSESSMENT & PLAN NOTE
Healed appearance. Picture documented in media tab.     -- Bed bound care   -- q2h turning   -- Protect pressure points   -- Moisture management

## 2022-01-15 NOTE — CONSULTS
"[facility] - Cardiac Intensive Care  Urology  Consult Note    Patient Name: Marlene Vergara  MRN: [id number]  Admission Date: 1/14/2022  Hospital Length of Stay: 1   Code Status: DNR   Attending Provider: Yue Smith MD   Consulting Provider: Memo Bee MD  Primary Care Physician: Xiomara Edwards MD  Principal Problem:<principal problem not specified>    Inpatient consult to Urology  Consult performed by: Memo Bee MD  Consult ordered by: Abel Edouard MD          Subjective:     HPI:  Marlene Vergara is a 89 year old female with history of HTN, glaucoma, CAD on ASA, and dementia who presented to [facility] ED via EMS for altered mental status. Urology was consulted for ureteral stones.    History obtained from daughter at bedside. Per daughter, patient was reportedly at baseline earlier today when she was attempting to "hydrate her" with "water, peppermint oil, and cinnamon." She reports that patient would open her eyes to command but would not swallow water without instruction. She reports that she then began to rub patient chest and throat while closing her mouth to facilitate swallowing. She then reports worsening mental status which prompted her to call EMS. She denies any coughing or vomiting after this occurred.      Upon arrival to [facility] ED she was obtunded and only responsive to verbal stimuli. She was a GCS 3. Goals of care were discussed at bedside and the decision was made to proceed with intubation. Patient profoundly hypotensive, given 1.1L IVF, and then started on vasopressor support. WBC wnl, Cr 2.1 (baseline 0.7), lactate >12 on admission. Blood cultures were drawn and she was started on broad spectrum antibiotics. CT scan concerning for bilateral pneumonia. There is right sided hydronephrosis with large volume right ureteral stones. There are no left sided renal or ureteral stones.       Past Medical History:   Diagnosis Date    FH: cataracts     Glaucoma of both eyes     Hypertension        Past " Surgical History:   Procedure Laterality Date    ANGIOPLASTY      EYE SURGERY      TONSILLECTOMY      TUMOR REMOVAL         Review of patient's allergies indicates:   Allergen Reactions    Pcn [penicillins] Anaphylaxis       Family History     Problem Relation (Age of Onset)    Asthma Daughter    Lung cancer Son    No Known Problems Mother, Father, Daughter, Daughter, Daughter, Son          Tobacco Use    Smoking status: Former Smoker     Years: 45.00     Types: Cigarettes     Quit date:      Years since quittin.0    Smokeless tobacco: Never Used   Substance and Sexual Activity    Alcohol use: No    Drug use: No    Sexual activity: Not Currently       Review of Systems   Unable to perform ROS      Objective:     Temp:  [92.3 °F (33.5 °C)-99.68 °F (37.6 °C)] 99.32 °F (37.4 °C)  Pulse:  [] 109  Resp:  [19-31] 30  SpO2:  [54 %-88 %] 54 %  BP: ()/(41-82) 90/57  Arterial Line BP: ()/(45-49) 121/48     Body mass index is 15.06 kg/m².           Drains     Drain                 NG/OG Tube 22 1753 [place] sump 18 Fr. Center mouth <1 day         Urethral Catheter 22 1840 Temperature probe <1 day                Physical Exam  Vitals reviewed.   Constitutional:       Appearance: She is well-developed. She is ill-appearing and toxic-appearing.      Comments: Intubated   Eyes:      General: No scleral icterus.     Extraocular Movements: EOM normal.   Cardiovascular:      Rate and Rhythm: Tachycardia present.   Pulmonary:      Effort: Pulmonary effort is normal. No respiratory distress.      Breath sounds: No stridor.      Comments: ETT in place  Abdominal:      General: There is no distension.      Palpations: Abdomen is soft.      Tenderness: There is no abdominal tenderness. There is no right CVA tenderness or left CVA tenderness.   Genitourinary:     Comments: Curiel in place draining dark yellow urine  Musculoskeletal:      Comments: Upper and lower extremity contractures    Psychiatric:         Mood and Affect: Mood and affect normal.         Significant Labs:    BMP:  Recent Labs   Lab 01/14/22  1738      K 4.4      CO2 12*   BUN 37*   CREATININE 2.1*   CALCIUM 9.6       CBC:  Recent Labs   Lab 01/14/22  1738 01/15/22  0324   WBC 17.91* 11.18   HGB 12.0 10.7*   HCT 41.6 33.4*   * 102*       Blood Culture:   Recent Labs   Lab 01/14/22 1738   LABBLOO No Growth to date  No Growth to date     Urine Culture: No results for input(s): LABURIN in the last 168 hours.  Urine Studies:   Recent Labs   Lab 01/14/22  1824   COLORU Yellow  Yellow   APPEARANCEUA Cloudy*  Cloudy*   PHUR 7.0  7.0   SPECGRAV 1.015  1.015   PROTEINUA 2+*  2+*   GLUCUA Negative  Negative   KETONESU Trace*  Trace*   BILIRUBINUA Negative  Negative   OCCULTUA 1+*  1+*   NITRITE Negative  Negative   LEUKOCYTESUR 2+*  2+*   RBCUA 8*   WBCUA >100*   BACTERIA Many*   SQUAMEPITHEL 16   HYALINECASTS 0       Significant Imaging:  As above                    Assessment and Plan:     Ureteral stone  Marlene Vergara is a 89 year old female with history of HTN, glaucoma, CAD on ASA, and dementia who presented to [facility] [place] via [place] for altered mental status. Urology was consulted for ureteral stones.  - discussed options for decompression of right kidney including ureteral stent, nephrostomy tube  - agree with broad spectrum antibiotics, tailor as cultures permit  - mIVF as tolerated  - PT, INR pending  - recommend IR consult for right nephrostomy tube  - will need definitive stone treatment after adequate treatment of underlying infection        VTE Risk Mitigation (From admission, onward)         Ordered     heparin (porcine) injection 5,000 Units  Every 8 hours         01/14/22 2220     IP VTE HIGH RISK PATIENT  Once         01/14/22 1732     Place sequential compression device  Until discontinued         01/14/22 1732                Thank you for your consult.    Memo Bee MD  Urology  [facility] -  Cardiac Intensive Care

## 2022-01-15 NOTE — NURSING
CC team made aware of pt's BP and the need to increase levophed. Requested central line and art line.

## 2022-01-15 NOTE — H&P
"[facility] - Emergency Dept  Critical Care Medicine  History & Physical    Patient Name: Marlene Vergara  MRN: [id number]  Admission Date: 1/14/2022  Hospital Length of Stay: 0 days  Code Status: Full Code  Attending Physician: Shaun Thompson MD   Primary Care Provider: Xiomara Edwards MD   Principal Problem: <principal problem not specified>    Subjective:     HPI:  Mrs. Vergara is a 89 year old female with PMH notable for HTN, open angle glaucoma, CAD on ASA, and dementia who presented to [facility] ED via EMS for altered mental status. History obtained from daughter at bedside. Per daughter, patient was reportedly at baseline earlier today when she was attempting to "hydrate her" with "water, peppermint oil, and cinnamon." She reports that patient would open her eyes to command but would not swallow water without instruction. She reports that she then began to rub patient chest and throat while closing her mouth to facilitate swallowing. She then reports worsening mental status which prompted her to call EMS. She denies any coughing or vomiting after this occurred.     Upon arrival to [facility] ED she was obtunded and only responsive to verbal stimuli. She was a GCS 3. Goals of care were discussed at bedside and the decision was made to proceed with intubation. Patient profoundly hypotensive, given 1.1L IVF, and then started on vasopressor support. Labs were difficult to obtain due to poor vasculature, but initial ABG 7.00/49/219/12. She was given 2 amps of sodium bicarb. Her rectal temperature was 94 degrees and she was placed on a warming blanket. Initial EKG concerning for ischemic changes. Was evaluated by cardiology and ECG showed inferior non specific ST changes, however, repeat ECG without any ischemic changes. Bedside U/S with low-normal LVEF and normal RV function, per cardiology. She was given IV Cipro and Flagyl.     Critical care consulted for undifferentiated shock.       Hospital/ICU Course:  No notes on file     Past " Medical History:   Diagnosis Date    FH: cataracts     Glaucoma of both eyes     Hypertension        Past Surgical History:   Procedure Laterality Date    ANGIOPLASTY      EYE SURGERY      TONSILLECTOMY      TUMOR REMOVAL         Review of patient's allergies indicates:   Allergen Reactions    Pcn [penicillins] Anaphylaxis       Family History     Problem Relation (Age of Onset)    Asthma Daughter    Lung cancer Son    No Known Problems Mother, Father, Daughter, Daughter, Daughter, Son        Tobacco Use    Smoking status: Former Smoker     Years: 45.00     Types: Cigarettes     Quit date:      Years since quittin.0    Smokeless tobacco: Never Used   Substance and Sexual Activity    Alcohol use: No    Drug use: No    Sexual activity: Not Currently      Review of Systems   Unable to perform ROS: Intubated     Objective:     Vital Signs (Most Recent):  Temp: (!) 94.1 °F (34.5 °C) (22)  Pulse: (!) 158 (22)  Resp: (!) 25 (22)  BP: (!) 80/51 (22)  SpO2: (!) 88 % (22) Vital Signs (24h Range):  Temp:  [94.1 °F (34.5 °C)] 94.1 °F (34.5 °C)  Pulse:  [] 158  Resp:  [19-26] 25  SpO2:  [61 %-88 %] 88 %  BP: ()/(46-62) 80/51   Weight: 38.6 kg (85 lb)  Body mass index is 15.06 kg/m².    No intake or output data in the 24 hours ending 22    Physical Exam  Vitals and nursing note reviewed.   Constitutional:       Appearance: She is cachectic. She is ill-appearing.      Interventions: She is sedated, intubated and restrained.      Comments: Temporal wasting   HENT:      Nose: Nose normal.      Mouth/Throat:      Mouth: Mucous membranes are dry.      Pharynx: Oropharynx is clear. No oropharyngeal exudate.      Comments: Poor dentition  Eyes:      General: No scleral icterus.     Comments: Bilateral cataracts    Cardiovascular:      Rate and Rhythm: Normal rate and regular rhythm.      Pulses: Normal pulses.      Heart sounds: Murmur  heard.   No gallop.    Pulmonary:      Effort: No respiratory distress. She is intubated.      Breath sounds: Normal breath sounds. No wheezing.   Abdominal:      General: There is no distension.      Tenderness: There is no abdominal tenderness.   Genitourinary:     Comments: Curiel patent and draining at bedside  Musculoskeletal:      Right lower leg: No edema.      Left lower leg: No edema.      Comments: Bilateral upper and lower extremity contractures   Skin:     General: Skin is dry.      Coloration: Skin is not jaundiced.      Comments: Healed sacral pressure injury   Neurological:      GCS: GCS eye subscore is 1. GCS verbal subscore is 1. GCS motor subscore is 1.         Vents:  Vent Mode: A/C (01/14/22 1647)  Set Rate: 14 BPM (01/14/22 1647)  Vt Set: 380 mL (01/14/22 1647)  Pressure Support: 0 cmH20 (01/14/22 1647)  PEEP/CPAP: 5 cmH20 (01/14/22 1647)  Oxygen Concentration (%): 75 (01/14/22 1802)  Peak Airway Pressure: 32 cmH2O (01/14/22 1647)  Plateau Pressure: 0 cmH20 (01/14/22 1647)  Total Ve: 5.44 mL (01/14/22 1647)  F/VT Ratio<105 (RSBI): (!) 67.18 (01/14/22 1647)  Lines/Drains/Airways     Drain                 NG/OG Tube 01/14/22 1753 [place] sump 18 Fr. Center mouth <1 day          Airway                 Airway - Non-Surgical 01/14/22 1633 Endotracheal Tube <1 day          Peripheral Intravenous Line                 Peripheral IV - Single Lumen 08/28/21 1234 18 G Left Forearm 139 days         Peripheral IV - Single Lumen 01/14/22 1801 20 G Left Upper Arm <1 day         Peripheral IV - Single Lumen 01/14/22 1802 18 G Right Other <1 day              Significant Labs:    CBC/Anemia Profile:  No results for input(s): WBC, HGB, HCT, PLT, MCV, RDW, IRON, FERRITIN, RETIC, FOLATE, [id number], OCCULTBLOOD in the last 48 hours.     Chemistries:  No results for input(s): NA, K, CL, CO2, BUN, CREATININE, CALCIUM, ALBUMIN, PROT, BILITOT, ALKPHOS, ALT, AST, GLUCOSE, MG, PHOS in the last 48 hours.    All pertinent labs  within the past 24 hours have been reviewed.    Significant Imaging: I have reviewed all pertinent imaging results/findings within the past 24 hours.    Assessment/Plan:     Neuro  Altered mental status  Presented to [facility] ED with altered mental status. GCS 3. Intubated in emergency department     -- Follow up CT head   -- Further management per Shock     Ophtho  Primary open angle glaucoma of both eyes  -- Continue home eye drops     Cardiac/Vascular  HTN (hypertension)  Holding home antihypertensives in setting of shock    Other  Shock  Undifferentiated shock in 89 year old female likely septic in setting of aspiration. Differentials include hypovolemic secondary to dehydration or cardiogenic with concern for ST changes. Bedside ECHO with decreased EF.     Sepsis sources ddx: aspiration vs urinary vs GI (liquid pink serous fluid noted from rectum)    -- Broad spectrum abx with Vanc / Flagyl (anaphylaxis to PCN)  -- Follow up blood / urine / sputum cultures   -- Vasopressors for MAP > 65  -- Temperature management   -- Follow up lactic acid   -- Follow up procalcitonin   -- Follow up thyroid studies   -- Follow up troponin   -- Follow up ECHO   -- Follow up CT head   -- Follow up CT chest / abdomen / pelvis     Pressure injury of sacral region, stage 2  Healed appearance. Picture documented in media tab.     -- Bed bound care   -- q2h turning   -- Protect pressure points   -- Moisture management     Cachexia  Cachectic 89 year old female with bilateral upper and lower extremity contractures.     -- Consult palliative care in AM. Ongoing goals of care conversations   -- If remains intubated, nutrition consult for tube feed recommendations          Critical Care Daily Checklist:    A: Awake: RASS Goal/Actual Goal:    Actual:     B: Spontaneous Breathing Trial Performed?     C: SAT & SBT Coordinated?  N/A                      D: Delirium: CAM-ICU     E: Early Mobility Performed? No   F: Feeding Goal:    Status:      Current Diet Order   Procedures    Diet NPO      AS: Analgesia/Sedation N/A   T: Thromboembolic Prophylaxis Holding    H: HOB > 300 Yes   U: Stress Ulcer Prophylaxis (if needed) Pepcid    G: Glucose Control Follow up    B: Bowel Function     I: Indwelling Catheter (Lines & Lewis) Necessity ETT, lewis, PIV    D: De-escalation of Antimicrobials/Pharmacotherapies Follow up culture data     Plan for the day/ETD Admit to ICU. Follow up lab and imaging     Code Status:  Family/Goals of Care: Full Code  Ongoing. Family at bedside      Critical Care Time: 60 minutes    Plan of care discussed with PCCM fellow. To be discussed with Dr. Smith and attestation to follow.     Critical secondary to Patient has a condition that poses threat to life and bodily function: altered mental status, and undifferentiated shock requiring vasopressor support     Critical care was time spent personally by me on the following activities: development of treatment plan with patient or surrogate and bedside caregivers, discussions with consultants, evaluation of patient's response to treatment, examination of patient, ordering and performing treatments and interventions, ordering and review of laboratory studies, ordering and review of radiographic studies, pulse oximetry, re-evaluation of patient's condition. This critical care time did not overlap with that of any other provider or involve time for any procedures.     Todd Kauffman, NP  Critical Care Medicine  [facility] - Emergency Dept

## 2022-01-15 NOTE — H&P
Vascular and Interventional Radiology History & Physical    Date:  1/15/2022        History of Present Illness:  Marlene Vergara is a 89 y.o. female who presented w/ AMS and sepsis who was found to have b/l pna and obstructive right hydronephrosis. IR consulted for right nephrostomy tube placement.      Past Medical History:  Past Medical History:   Diagnosis Date    FH: cataracts     Glaucoma of both eyes     Hypertension        Past Surgical History:  Past Surgical History:   Procedure Laterality Date    ANGIOPLASTY      EYE SURGERY      TONSILLECTOMY      TUMOR REMOVAL          Sedation History:    Pt is intubated    Social History:  Social History     Tobacco Use    Smoking status: Former Smoker     Years: 45.00     Types: Cigarettes     Quit date:      Years since quittin.0    Smokeless tobacco: Never Used   Substance Use Topics    Alcohol use: No    Drug use: No        Home Medications:   Prior to Admission medications    Medication Sig Start Date End Date Taking? Authorizing Provider   acetaminophen (TYLENOL) 500 MG tablet Take 2 tablets (1,000 mg total) by mouth 2 (two) times a day. 21   Carol Luna MD   aspirin (ECOTRIN) 81 MG EC tablet Take 81 mg by mouth once daily.    Historical Provider   bimatoprost (LUMIGAN) 0.01 % Drop Place 1 drop into both eyes every evening. 21   Xiomara Edwards MD   brimonidine-timoloL (COMBIGAN) 0.2-0.5 % Drop Place 1 drop into both eyes 2 (two) times daily. 21   Xiomara Edwards MD   DULoxetine (CYMBALTA) 20 MG capsule Take 1 capsule (20 mg total) by mouth once daily. 9/10/21 9/10/22  Xiomara Edwards MD   fluticasone propionate (FLONASE) 50 mcg/actuation nasal spray 2 sprays (100 mcg total) by Each Nostril route once daily. 21   Xiomara Edwards MD   melatonin (MELATIN) 3 mg tablet Take 2 tablets (6 mg total) by mouth nightly. 21   Carol Luna MD   mupirocin (BACTROBAN) 2 % ointment Apply to wound left third toe twice daily.  1/20/15   Anthony Bass, JACK   nitroGLYCERIN (NITROSTAT) 0.4 MG SL tablet Place 1 tablet (0.4 mg total) under the tongue every 5 (five) minutes as needed for Chest pain. 8/13/21 9/22/21  Arlen Bowers MD       Inpatient Medications:    Current Facility-Administered Medications:     aztreonam (AZACTAM) 1,000 mg in dextrose 5 % 50 mL IVPB, 1,000 mg, Intravenous, Q8H, Abel Edouard MD, Last Rate: 100 mL/hr at 01/15/22 0422, 1,000 mg at 01/15/22 0422    bimatoprost 0.01 % ophthalmic drops 1 drop, 1 drop, Both Eyes, QHS, Todd Kauffman NP, 1 drop at 01/14/22 2255    brimonidine 0.2% ophthalmic solution 1 drop, 1 drop, Both Eyes, BID, Shaun Thompson MD, 1 drop at 01/14/22 2255    famotidine (PF) injection 20 mg, 20 mg, Intravenous, Daily, Todd Kauffman NP    fentaNYL 2500 mcg in 0.9% sodium chloride 250 mL infusion premix (titrating), 0-250 mcg/hr, Intravenous, Continuous, Shaun Thompson MD, Last Rate: 12.5 mL/hr at 01/15/22 0400, 125 mcg/hr at 01/15/22 0400    fludrocortisone tablet 100 mcg, 100 mcg, Oral, Daily, Todd Kauffman NP, 100 mcg at 01/14/22 1955    heparin (porcine) injection 5,000 Units, 5,000 Units, Subcutaneous, Q8H, Abel Edouard MD    hydrocortisone sodium succinate injection 100 mg, 100 mg, Intravenous, Q8H, Todd Kauffman NP, 100 mg at 01/15/22 0100    metronidazole IVPB 500 mg, 500 mg, Intravenous, Q8H, Todd Kauffman NP, Stopped at 01/15/22 0356    NORepinephrine bitartrate 8 mg in dextrose 5% 250 mL infusion, 0-3 mcg/kg/min, Intravenous, Continuous, Yue Smith MD, Last Rate: 72.4 mL/hr at 01/15/22 0441, 1 mcg/kg/min at 01/15/22 0441    phenylephrine HCl in 0.9% NaCl 1 mg/10 mL (100 mcg/mL) syringe 100 mcg, 100 mcg, Intravenous, Q5 Min PRN, Shaun Thompson MD, 100 mcg at 01/14/22 1640    sodium bicarbonate 8.4 % (1 mEq/mL) injection, , , ,     sodium bicarbonate 8.4 % (1 mEq/mL) injection, , , ,     sodium chloride 0.9% flush 10  mL, 10 mL, Intravenous, PRN, Todd Kauffman NP    timolol maleate 0.5% ophthalmic solution 1 drop, 1 drop, Both Eyes, BID, Shaun Thompson MD, 1 drop at 01/14/22 2255    Pharmacy to dose Vancomycin consult, , , Once **AND** vancomycin - pharmacy to dose, , Intravenous, pharmacy to manage frequency, Todd Kauffman NP    vasopressin (PITRESSIN) 0.2 Units/mL in dextrose 5 % 100 mL infusion, 0.04 Units/min, Intravenous, Continuous, Todd Kauffman NP, Last Rate: 12 mL/hr at 01/15/22 0400, 0.04 Units/min at 01/15/22 0400     Anticoagulants/Antiplatelets:   no anticoagulation    Allergies:   Review of patient's allergies indicates:   Allergen Reactions    Pcn [penicillins] Anaphylaxis       Review of Systems:   As documented in primary provider H&P.    Vital Signs (Most Recent):  Temp: 98.96 °F (37.2 °C) (01/15/22 0415)  Pulse: 105 (01/15/22 0415)  Resp: (!) 45 (01/15/22 0415)  BP: (!) 86/58 (01/15/22 0415)  SpO2: (!) 54 % (01/15/22 0030)    Physical Exam:  Pt is intubated  Regular rate and rhythm  Breathing unlabored  Abdomen benign  Extremities warm and well perfused    Sedation Exam:  ASA: per anesthesia   Mallampati: per anesthesia     Laboratory:  Lab Results   Component Value Date    INR 1.6 (H) 01/15/2022       Lab Results   Component Value Date    WBC 11.18 01/15/2022    HGB 10.7 (L) 01/15/2022    HCT 33.4 (L) 01/15/2022    MCV 89 01/15/2022     (L) 01/15/2022      Lab Results   Component Value Date     01/14/2022     01/14/2022    K 4.4 01/14/2022     01/14/2022    CO2 12 (L) 01/14/2022    BUN 37 (H) 01/14/2022    CREATININE 2.1 (H) 01/14/2022    CALCIUM 9.6 01/14/2022    MG 1.8 08/29/2021     (H) 01/14/2022     (H) 01/14/2022    ALBUMIN 2.3 (L) 01/14/2022    BILITOT 0.4 01/14/2022       Imaging:  Reviewed.      ASSESSMENT/PLAN:   Pt is 89yoF w/ b/l pna and obstructive right hydronephrosis who was admitted for sepsis and AMS. IR consulted for right  nephrostomy tube placement.   Procedure planned for today.                       Sedation Plan: per anesthesia  Patient will undergo: right nephrostomy tube placement      Madison Rao MD  R2 Interventional/Diagnostic Radiology

## 2022-01-15 NOTE — HPI
"Marlene Vergara is a 89 year old female with history of HTN, glaucoma, CAD on ASA, and dementia who presented to [facility] ED via EMS for altered mental status. Urology was consulted for ureteral stones.    History obtained from daughter at bedside. Per daughter, patient was reportedly at baseline earlier today when she was attempting to "hydrate her" with "water, peppermint oil, and cinnamon." She reports that patient would open her eyes to command but would not swallow water without instruction. She reports that she then began to rub patient chest and throat while closing her mouth to facilitate swallowing. She then reports worsening mental status which prompted her to call EMS. She denies any coughing or vomiting after this occurred.      Upon arrival to [facility] ED she was obtunded and only responsive to verbal stimuli. She was a GCS 3. Goals of care were discussed at bedside and the decision was made to proceed with intubation. Patient profoundly hypotensive, given 1.1L IVF, and then started on vasopressor support. WBC wnl, Cr 2.1 (baseline 0.7), lactate >12 on admission. Blood cultures were drawn and she was started on broad spectrum antibiotics. CT scan concerning for bilateral pneumonia. There is right sided hydronephrosis with large volume right ureteral stones. There are no left sided renal or ureteral stones.   "

## 2022-01-15 NOTE — SUBJECTIVE & OBJECTIVE
Past Medical History:   Diagnosis Date    FH: cataracts     Glaucoma of both eyes     Hypertension        Past Surgical History:   Procedure Laterality Date    ANGIOPLASTY      EYE SURGERY      TONSILLECTOMY      TUMOR REMOVAL         Review of patient's allergies indicates:   Allergen Reactions    Pcn [penicillins] Anaphylaxis       Family History     Problem Relation (Age of Onset)    Asthma Daughter    Lung cancer Son    No Known Problems Mother, Father, Daughter, Daughter, Daughter, Son        Tobacco Use    Smoking status: Former Smoker     Years: 45.00     Types: Cigarettes     Quit date:      Years since quittin.0    Smokeless tobacco: Never Used   Substance and Sexual Activity    Alcohol use: No    Drug use: No    Sexual activity: Not Currently      Review of Systems   Unable to perform ROS: Intubated     Objective:     Vital Signs (Most Recent):  Temp: (!) 94.1 °F (34.5 °C) (22)  Pulse: (!) 158 (22)  Resp: (!) 25 (22)  BP: (!) 80/51 (22)  SpO2: (!) 88 % (22) Vital Signs (24h Range):  Temp:  [94.1 °F (34.5 °C)] 94.1 °F (34.5 °C)  Pulse:  [] 158  Resp:  [19-26] 25  SpO2:  [61 %-88 %] 88 %  BP: ()/(46-62) 80/51   Weight: 38.6 kg (85 lb)  Body mass index is 15.06 kg/m².    No intake or output data in the 24 hours ending 22    Physical Exam  Vitals and nursing note reviewed.   Constitutional:       Appearance: She is cachectic. She is ill-appearing.      Interventions: She is sedated, intubated and restrained.      Comments: Temporal wasting   HENT:      Nose: Nose normal.      Mouth/Throat:      Mouth: Mucous membranes are dry.      Pharynx: Oropharynx is clear. No oropharyngeal exudate.      Comments: Poor dentition  Eyes:      General: No scleral icterus.     Comments: Bilateral cataracts    Cardiovascular:      Rate and Rhythm: Normal rate and regular rhythm.      Pulses: Normal pulses.      Heart sounds:  Murmur heard.   No gallop.    Pulmonary:      Effort: No respiratory distress. She is intubated.      Breath sounds: Normal breath sounds. No wheezing.   Abdominal:      General: There is no distension.      Tenderness: There is no abdominal tenderness.   Genitourinary:     Comments: Curiel patent and draining at bedside  Musculoskeletal:      Right lower leg: No edema.      Left lower leg: No edema.      Comments: Bilateral upper and lower extremity contractures   Skin:     General: Skin is dry.      Coloration: Skin is not jaundiced.      Comments: Healed sacral pressure injury   Neurological:      GCS: GCS eye subscore is 1. GCS verbal subscore is 1. GCS motor subscore is 1.         Vents:  Vent Mode: A/C (01/14/22 1647)  Set Rate: 14 BPM (01/14/22 1647)  Vt Set: 380 mL (01/14/22 1647)  Pressure Support: 0 cmH20 (01/14/22 1647)  PEEP/CPAP: 5 cmH20 (01/14/22 1647)  Oxygen Concentration (%): 75 (01/14/22 1802)  Peak Airway Pressure: 32 cmH2O (01/14/22 1647)  Plateau Pressure: 0 cmH20 (01/14/22 1647)  Total Ve: 5.44 mL (01/14/22 1647)  F/VT Ratio<105 (RSBI): (!) 67.18 (01/14/22 1647)  Lines/Drains/Airways     Drain                 NG/OG Tube 01/14/22 1753 [place] sump 18 Fr. Center mouth <1 day          Airway                 Airway - Non-Surgical 01/14/22 1633 Endotracheal Tube <1 day          Peripheral Intravenous Line                 Peripheral IV - Single Lumen 08/28/21 1234 18 G Left Forearm 139 days         Peripheral IV - Single Lumen 01/14/22 1801 20 G Left Upper Arm <1 day         Peripheral IV - Single Lumen 01/14/22 1802 18 G Right Other <1 day              Significant Labs:    CBC/Anemia Profile:  No results for input(s): WBC, HGB, HCT, PLT, MCV, RDW, IRON, FERRITIN, RETIC, FOLATE, [id number], OCCULTBLOOD in the last 48 hours.     Chemistries:  No results for input(s): NA, K, CL, CO2, BUN, CREATININE, CALCIUM, ALBUMIN, PROT, BILITOT, ALKPHOS, ALT, AST, GLUCOSE, MG, PHOS in the last 48 hours.    All  pertinent labs within the past 24 hours have been reviewed.    Significant Imaging: I have reviewed all pertinent imaging results/findings within the past 24 hours.

## 2022-01-15 NOTE — PROCEDURES
"Marlene Vergara is a 89 y.o. female patient.    Temp: 99.32 °F (37.4 °C) (01/15/22 0215)  Pulse: 97 (01/15/22 0215)  Resp: (!) 30 (01/15/22 0215)  BP: (!) 91/56 (01/15/22 0215)  SpO2: (!) 54 % (01/15/22 0030)  Weight: 38.6 kg (85 lb) (01/14/22 1559)  Height: 5' 3" (160 cm) (01/14/22 1559)       Arterial Line    Date/Time: 1/15/2022 3:26 AM  Location procedure was performed: [facility] CRIT[facility]  Performed by: Domi Langston MD  Authorized by: Domi Langston MD   Consent Done: Yes  Indications: multiple ABGs    Anesthesia:  Local Anesthetic: lidocaine 1% without epinephrine  Complications: No  Specimens: No  Post-procedure: line sutured and dressing applied          1/15/2022  "

## 2022-01-15 NOTE — ASSESSMENT & PLAN NOTE
Presented to [facility] ED with altered mental status. GCS 3. Intubated in emergency department     -- Follow up CT head   -- Further management per Shock

## 2022-01-15 NOTE — SUBJECTIVE & OBJECTIVE
Past Medical History:   Diagnosis Date    FH: cataracts     Glaucoma of both eyes     Hypertension        Past Surgical History:   Procedure Laterality Date    ANGIOPLASTY      EYE SURGERY      TONSILLECTOMY      TUMOR REMOVAL         Review of patient's allergies indicates:   Allergen Reactions    Pcn [penicillins] Anaphylaxis       Family History     Problem Relation (Age of Onset)    Asthma Daughter    Lung cancer Son    No Known Problems Mother, Father, Daughter, Daughter, Daughter, Son          Tobacco Use    Smoking status: Former Smoker     Years: 45.00     Types: Cigarettes     Quit date:      Years since quittin.0    Smokeless tobacco: Never Used   Substance and Sexual Activity    Alcohol use: No    Drug use: No    Sexual activity: Not Currently       Review of Systems   Unable to perform ROS      Objective:     Temp:  [92.3 °F (33.5 °C)-99.68 °F (37.6 °C)] 99.32 °F (37.4 °C)  Pulse:  [] 109  Resp:  [19-31] 30  SpO2:  [54 %-88 %] 54 %  BP: ()/(41-82) 90/57  Arterial Line BP: ()/(45-49) 121/48     Body mass index is 15.06 kg/m².           Drains     Drain                 NG/OG Tube 22 1753 [place] sump 18 Fr. Center mouth <1 day         Urethral Catheter 22 1840 Temperature probe <1 day                Physical Exam  Vitals reviewed.   Constitutional:       Appearance: She is well-developed. She is ill-appearing and toxic-appearing.      Comments: Intubated   Eyes:      General: No scleral icterus.     Extraocular Movements: EOM normal.   Cardiovascular:      Rate and Rhythm: Tachycardia present.   Pulmonary:      Effort: Pulmonary effort is normal. No respiratory distress.      Breath sounds: No stridor.      Comments: ETT in place  Abdominal:      General: There is no distension.      Palpations: Abdomen is soft.      Tenderness: There is no abdominal tenderness. There is no right CVA tenderness or left CVA tenderness.   Genitourinary:     Comments: Curiel in  place draining dark yellow urine  Musculoskeletal:      Comments: Upper and lower extremity contractures   Psychiatric:         Mood and Affect: Mood and affect normal.         Significant Labs:    BMP:  Recent Labs   Lab 01/14/22  1738      K 4.4      CO2 12*   BUN 37*   CREATININE 2.1*   CALCIUM 9.6       CBC:  Recent Labs   Lab 01/14/22  1738 01/15/22  0324   WBC 17.91* 11.18   HGB 12.0 10.7*   HCT 41.6 33.4*   * 102*       Blood Culture:   Recent Labs   Lab 01/14/22 1738   LABBLOO No Growth to date  No Growth to date     Urine Culture: No results for input(s): LABURIN in the last 168 hours.  Urine Studies:   Recent Labs   Lab 01/14/22  1824   COLORU Yellow  Yellow   APPEARANCEUA Cloudy*  Cloudy*   PHUR 7.0  7.0   SPECGRAV 1.015  1.015   PROTEINUA 2+*  2+*   GLUCUA Negative  Negative   KETONESU Trace*  Trace*   BILIRUBINUA Negative  Negative   OCCULTUA 1+*  1+*   NITRITE Negative  Negative   LEUKOCYTESUR 2+*  2+*   RBCUA 8*   WBCUA >100*   BACTERIA Many*   SQUAMEPITHEL 16   HYALINECASTS 0       Significant Imaging:  As above

## 2022-01-15 NOTE — ASSESSMENT & PLAN NOTE
Undifferentiated shock in 89 year old female likely septic in setting of aspiration. Differentials include hypovolemic secondary to dehydration or cardiogenic with concern for ST changes. Bedside ECHO with decreased EF.     Sepsis sources ddx: aspiration vs urinary vs GI (liquid pink serous fluid noted from rectum)    -- Broad spectrum abx with Vanc / Flagyl (anaphylaxis to PCN)  -- Follow up blood / urine / sputum cultures   -- Vasopressors for MAP > 65  -- Temperature management   -- Follow up lactic acid   -- Follow up procalcitonin   -- Follow up thyroid studies   -- Follow up troponin   -- Follow up ECHO   -- Follow up CT head   -- Follow up CT chest / abdomen / pelvis

## 2022-01-15 NOTE — PROGRESS NOTES
Pharmacist Renal Dose Adjustment Note    Marlene Vergara is a 89 y.o. female being treated with the medication aztreonam    Patient Data:    Vital Signs (Most Recent):  Temp: 99.68 °F (37.6 °C) (01/15/22 0311)  Pulse: 73 (01/15/22 0311)  Resp: 20 (01/15/22 0311)  BP: (!) 73/54 (01/15/22 0311)  SpO2: (!) 54 % (01/15/22 0030)   Vital Signs (72h Range):  Temp:  [92.3 °F (33.5 °C)-99.68 °F (37.6 °C)]   Pulse:  []   Resp:  [19-31]   BP: ()/(46-82)   SpO2:  [54 %-88 %]      Recent Labs   Lab 01/14/22  1738   CREATININE 2.1*     Serum creatinine: 2.1 mg/dL (H) 01/14/22 1738  Estimated creatinine clearance: 10.8 mL/min (A)    Aztreonam 2 g q24h will be changed to 1 g q8h    Pharmacist's Name: Bernardo Duong  Pharmacist's Extension: [phone]

## 2022-01-15 NOTE — HOSPITAL COURSE
Patient admitted with septic shock secondary to urinary tract infection. Blood cultures and urine culture obtained. Fluid resuscitated in the ED but continued to have low blood pressures so she was started on vasopressors. Started on Aztreonam, flagyl and vancomycin empirically. Labs notable for significant metabolic acidosis secondary to lactic acid. She was given several amps of bicarbonate with little improvement and lactate continued to be >12. Per goals of care talk in the ED between Dr Davis and family,patient was made DNR, however the family was interested in pursuing all treatments. A Left IJ CVC was placed sucesfully, confirmed placement with CXR, as well as a L arterial line for hemodynamic monitoring. CT C/A/P with Left sided infiltrate suspicious for PNA and also noted to have R-sided mild hydronephrosis. Urology was consulted who recommended nephrostomy placement by IR. However, before patient was taken for the procedure she went into asystole. No CPR was given due to DNR status. Daughter at bedside, provided emotional support.

## 2022-01-15 NOTE — HPI
"Mrs. Vergara is a 89 year old female with PMH notable for HTN, open angle glaucoma, CAD on ASA, and dementia who [place] [facility] [place] for altered mental status. History obtained from daughter at bedside. Per daughter, patient was reportedly at baseline earlier today when she was attempting to "hydrate her" with "water, peppermint oil, and cinnamon." She reports that patient would open her eyes to command but would not swallow water without instruction. She reports that she then began to rub patient chest and throat while closing her mouth to facilitate swallowing. She then reports worsening mental status which prompted her to call EMS. She denies any coughing or vomiting after this occurred.     Upon arrival [place] [facility] [place] she was obtunded and only responsive to verbal stimuli. She was a GCS 3. Goals of care were discussed at bedside and the decision was made to proceed with intubation. Patient profoundly hypotensive, given 1.1L IVF, and then started on vasopressor support. Labs were difficult to obtain due to poor vasculature, but initial ABG 7.00/49/219/12. She was given 2 amps of sodium bicarb. Her rectal temperature was 94 degrees and she was placed on a warming blanket. Initial EKG concerning for ischemic changes. Was evaluated by cardiology and ECG showed inferior non specific ST changes, however, repeat ECG without any ischemic changes. Bedside U/S with low-normal LVEF and normal RV function, per cardiology. She was given IV Cipro and Flagyl.     Critical care consulted for undifferentiated shock.   "

## 2022-01-15 NOTE — ASSESSMENT & PLAN NOTE
Marlene Vergara is a 89 year old female with history of HTN, glaucoma, CAD on ASA, and dementia who presented to [facility] ED via EMS for altered mental status. Urology was consulted for ureteral stones.  - discussed options for decompression of right kidney including ureteral stent, nephrostomy tube  - agree with broad spectrum antibiotics, tailor as cultures permit  - mIVF as tolerated  - PT, INR pending  - recommend IR consult for right nephrostomy tube  - will need definitive stone treatment after adequate treatment of underlying infection

## 2022-01-15 NOTE — NURSING
Pt arrived to floor via stretcher from ER. Pt has Levophed , fentanyl, and vasopressin infusing into peripheral IV's. Pt opens eyes to painful stimuli. Curiel intact with 20ml dark isma urine. Pt incontinent of bowel, large reddish seedy stool. Complete bath given. CC team made awre of pt's arrival to floor. Continue to monitor

## 2022-01-16 LAB
BACTERIA UR CULT: NORMAL
BACTERIA UR CULT: NORMAL

## 2022-01-19 LAB
BACTERIA BLD CULT: ABNORMAL

## 2022-01-25 NOTE — PHYSICIAN QUERY
PT Name: Marlene Vergara  MR #: [id number]     DOCUMENTATION CLARIFICATION     CDS/: Nena Navarro RN CDI         Contact information: [email or web address]  This form is a permanent document in the medical record.     Query Date: January 25, 2022    By submitting this query, we are merely seeking further clarification of documentation.  Please utilize your independent clinical judgment when addressing the question(s) below.  The Medical Record contains the following   Indicators   Supporting Clinical Findings Location in Medical Record   X SOB, SOLO, Wheezing, Productive Cough, Use of Accessory Muscles, etc. Discussed patient's current critical condition to pt's daughter & HCPOA Urszula Vergara at bedside. Explained to Urszula that patient was currently unstable. Urszula reports that patient would like all measures to be done including intubation and chest compressions.    Patient hypotensive w/ cold extremities, started on levophed gtt and IV pushes of phenylephrine.  Patient with continued hypoxia with SpO2 65-75% on nonrebreather, tachypnea and accessory muscle use. Urszula provided verbal consent for intubation.    Accessory muscle usage present. Tachypnea noted. She is in respiratory distress.     Bibasilar rales   ER 1/14 RONAK larios MD   X RR         ABGs         O2 sat                     HR         RR        Sat  1/14  ER       19=31     58-99    01/14/22 1649  ABG   POC PH 7.001 Low Panic     POC PCO2 49.3 High     POC PO2 219 High     POC HCO3 12.2 Low     POC BE -19    POC SATURATED O2 99    POC TCO2 14 Low     Rate 14    Vent         Vitals   X Hypoxia/Hypercapnia Initial Assessment:   89 yo F p/w acute mental status change and acute hypoxia. Hypothermic, hypotensive 100/62, tachypneic, and hypoxic.    Patient with continued hypoxia with SpO2 65-75% on nonrebreather, tachypnea and accessory muscle use. Urszula provided verbal consent for intubation.   ER 1/14 RONAK larios MD   X BiPAP/Intubation/Mechanical  Ventilation Intubation 710 PM    Patient was intubated for for airway protection. ER 1/14 RNOAK larios MD   X Supplemental O2 Vent 70-80% Vitals    Home O2, Oxygen Dependence     X Respiratory distress or failure She is in respiratory distress.  ER 1/14 RONAK larios MD     X Radiology findings CT Chest  Bilateral perihilar airspace opacities, left worse than right.  Findings could be seen the setting of infection, inflammation, or less likely edema.   Radiology   X Acute/Chronic Illness Lactic acidosis  Septic shock      Altered mental staus  Sepsis   Shock  Glaucoma  HTN  Cachexia   ER  Note 1/14 RONAK Kirk MD        H&P 1/14   W Daly, NP/  C Luis MILLS    Treatment      Other           The noted clinical guidelines are only system guidelines and do not replace the providers clinical judgment.    Provider, please specify the diagnosis or diagnoses associated with above clinical findings.     [    ] Acute Respiratory Failure with Hypoxia - ABG pO2 < 60 mmHg or O2 sat of <91% on room air and respiratory symptoms documented     [    ] Acute Respiratory Failure with Hypoxia and Hypercapnia - Hypoxia: ABG pO2 < 60 mmHg or O2 sat of <91% on room air and Hypercapnia: pCO2 > 50 mmHg with pH < 7.35 and respiratory symptoms documented     [x    ] No Respiratory Failure, Maintained on Vent for Routine Care or Airway Protection -  purposely intubated for airway protection (e.g.: angioedema, stroke, trauma); without meeting the criteria for respiratory failure.      [    ] Other Respiratory Diagnosis (please specify): _________________     [   ] Clinically Undetermined     Please document in your progress notes daily for the duration of treatment until resolved and include in your discharge summary.     Reference:    YOLANDE Rush MD. (2020, March 13). Acute respiratory distress syndrome: Clinical features, diagnosis, and complications in adults ([id number] JAMEY Connor MD & [id number] MARIAD Can MD, Eds.).  Retrieved November 13, 2020, from [email or web address]-[email or web address]-features-[email or web address]-[email or web address]?search=ards&source=search_result&selectedTitle=1~150&usage_type=default&display_rank=1  Form No. 78427

## 2022-01-25 NOTE — PHYSICIAN QUERY
"PT Name: Marlene Vergara  MR #: [id number]    DOCUMENTATION CLARIFICATION     CDS/: Nena Navarro RN CDI           Contact information:  [email or web address]    This form is a permanent document in the medical record.     Query Date: January 25, 2022    By submitting this query, we are merely seeking further clarification of documentation.. Please utilize your independent clinical judgment when addressing the question(s) below.    The medical record contains the following:   Indicators  Supporting Clinical Findings Location in Medical Record    % of Estimated Energy Intake over a time frame from p.o., TF, or TPN      Weight Status over a time frame     X Subcutaneous Fat and/or Muscle Loss Temporal wasting  H&P 1/14   W HANG Kauffman/  RAMIN Smith MD     X Fluid Accumulation or Edema No edema H&P 1/14   W HANG Kauffman/  RAMIN Smith MD   X Wt/BMI/Usual Body Weight Height 5'3"  Weight 85 lbs  BMI 15.1   Vitals     X Delayed Wound Healing/Failure to Thrive Healed sacral pressure injury  H&P 1/14   W HANG Kauffman/  RAMIN Smith MD     X Acute or Chronic Illness Severe protein-energy malnutrition    Cachexia  Cachectic 89 year old female with bilateral upper and lower extremity contractures.      -- Consult palliative care in AM. Ongoing goals of care conversations   -- If remains intubated, nutrition consult for tube feed recommendations    Altered mental staus  Sepsis   Shock  Glaucoma  HTN ER MD 1/14 RONAK Thompson MD    H&P 1/14   W HANG Kauffman/  RAMIN Smith MD    Medication      Treatment     X Other She is cachectic. She is ill-appearing.   Poor dentition  Bilateral upper and lower extremity contractures  H&P 1/14   W HANG Kauffman/  RAMIN Smith MD     AND / ANNE MARIE Clinical Characteristics (October 2011)  A minimum of two characteristics is recommended for diagnosing either moderate or severe malnutrition   Mild Malnutrition Moderate Malnutrition Severe Malnutrition   Energy Intake from p.o., TF or TPN. < 75% intake " of estimated energy needs for less than 7 days < 75% intake of estimated energy needs for greater than 7 days < 50% intake of estimated energy needs for > 5 days   Weight Loss 1-2% in 1 month  5% in 3 months  7.5% in 6 months  10% in 1 year 1-2 % in 1 week  5% in 1 month  7.5% in 3 months  10% in 6 months  20% in 1 year > 2% in 1 week  > 5% in 1 month  > 7.5% in 3 months  > 10% in 6 months  > 20% in 1 year   Physical Findings     None *Mild subcutaneous fat and/or muscle loss  *Mild fluid accumulation  *Stage II decubitus  *Surgical wound or non-healing wound *Mod/severe subcutaneous fat and/or muscle loss  *Mod/severe fluid accumulation  *Stage III or IV decubitus  *Non-healing surgical wound     Provider, please specify diagnosis or diagnoses associated with above clinical findings.    [  ] Mild Protein-Calorie Malnutrition   [  ] Moderate Protein-Calorie Malnutrition   [  x] Severe Protein-Calorie Malnutrition   [  ] Malnutrition, Unspecified degree   [  ] Cachexia   [  ] Underweight   [  ] Other Nutritional Diagnosis (please specify): _______   [  ] Malnutrition ruled out   [  ] Clinically Undetermined       Please document in your progress notes daily for the duration of treatment until resolved and include in your discharge summary.

## 2022-01-25 NOTE — PHYSICIAN QUERY
PT Name: Marlene Vergara  MR #: [id number]     DOCUMENTATION CLARIFICATION     CDS/: Nena Navarro RN CDI          Contact information: [email or web address]  This form is a permanent document in the medical record.     Query Date: January 25, 2022    By submitting this query, we are merely seeking further clarification of documentation.  Please utilize your independent clinical judgment when addressing the question(s) below.  The Medical Record contains the following:  Indicators Supporting Clinical Findings Location in Medical Record   X HR         RR          BP        Temp                      HR         RR        BP                     Temp  1/14  ER       19=31  67/46 - 126/82   92.3 - 97.7   1/14 0201  50           21       88/41                  99.6 bladder          0301  109         31       78/57                  99.1          0401  100         35       75/58                  99.1   Vitals   X Lactic Acid          Procalcitonin            Lactate               1/14   >12.0  11.3          1/15   >12.0   Lab   X WBC           Bands          CRP            WBC   Bands     1/14   17.91   13.0      1/15   11.18   Lab   X Culture(s) 1/14 Blood C/S rt hand peripheral  Escherichia Coli  Staph Epidermidis    1/14 Blood C/S lt upper arm peripheral  Escherichia Coli  Staphylococcus Pettenkoferi    Urine c/s 1/14  Multiple organisms isolated. None in predominance.   Lab   X AMS, Confusion, LOC, etc. Altered mental staus   Family states pt was talking and responding yesterday. Pt is reponsive to painful stimuli only at this time.   She appears lethargic   ER MD Note   1/14 A ric MILLS   X Organ Dysfunction/Failure NORAH  Labs concerning for multiorgan failure and septic shock ER  Note 1/14 RONAK Kirk MD     X Bacteremia or Sepsis / Septic Sepsis H&P 1/14   W Daly NP/  C Luis MILLS   X Known or Suspected Source of Infection documented Sepsis sources ddx: aspiration vs urinary vs GI (liquid pink serous fluid  noted from rectum)    admitted with septic shock secondary to urinary tract infection. Blood cultures and urine culture obtained. H&P 1/14   W HANG Kauffman/  RAMIN Smith MD  D/C summary 1/15  RONAK Edouard MD/  RAMIN Smith MD    (Failed) Outpatient Treatment     X Medication Broad spectrum abx with Vanc / Flagyl (anaphylaxis to PCN)  profoundly hypotensive, given 1.1L IVF, and then started on vasopressor   Temperature management  H&P 1/14   W HANG Kauffman/  RAMIN Smith MD   X Treatment In ER warming blanket.      Labs notable for significant metabolic acidosis secondary to lactic acid. She was given several amps of bicarbonate with little improvement and lactate continued to be >12.   H&P 1/14   W HANG Kauffman/  RAMIN Smith MD  D/C summary 1/15  RONAK Edouard MD/  RAMIN Smith MD    Other             Provider, please further specify the Septic shock diagnosis associated with above clinical findings.          Miko ALL that apply;      [  x ] Septic shock due to E Coli       [   ] Septic shock due to Staphylococcus Pettenkoferi      [   ] Septic shock due to Staph Epidermidis     [   ] Septic shock due to other, specify________________      [   ] Bacteremia without Septic shock, specify organism____________________     [   ] Other Infectious Disease (please specify): __________     [  ] Clinically Undetermined         Please document in your progress notes daily for the duration of treatment until resolved and include in your discharge summary.

## 2022-01-25 NOTE — PHYSICIAN QUERY
PT Name: Marlene Vergara  MR #: [id number]    DOCUMENTATION CLARIFICATION     CDS/: Nena EMERY           Contact information: [email or web address]  This form is a permanent document in the medical record.     Query Date: January 25, 2022    By submitting this query, we are merely seeking further clarification of documentation. Please utilize your independent clinical judgment when addressing the question(s) below.    The Medical Record contains the following:   Indicators   Supporting Clinical Findings Location in Medical Record   X AMS, Confusion,  LOC, etc.  Chief Complain Altered Mental Satus Family states pt was talking and responding yesterday. Pt is responsive to painful stimuli only at this time.   She says the patient had been her normal self and baseline prior to this, she had been speaking on the phone yesterday.  She appears lethargic.    She was a GCS 3  Altered mental status  Presented to [facility] [place] with altered mental status. GCS 3. Intubated in emergency department    -- Follow up CT head   -- Further management per Shock    ER  Note 1/14 A Reagan MILLS                H&P 1/14   W HANG Kauffman/  RAMIN Smith MD     X Acute/Chronic Illness Lactic acidosis  Septic shock     Altered mental staus  Sepsis   Shock  Glaucoma  HTN  Cachexia   ER  Note 1/14 RONAK Kirk MD      H&P 1/14   W HANG Kauffman/  RAMIN Smith MD   X Radiology Findings cxr 1/14  Bilateral perihilar airspace opacities, left worse than right.  Findings could be seen the setting of infection, inflammation, or less likely edema.    CT scan concerning for bilateral pneumonia. There is right sided hydronephrosis with large volume right ureteral stones. There are no left sided renal or ureteral stones.   Radiology          Urology note 1/15  R Rohit MILLS/ JOSELUIS Ding MD   X Electrolyte Imbalance Lactic acidosis  NORAH                 BUN  Cr      NA      Ca    Phosphorus    CO2     1//14    37      2.1                                                  12  1/15     40      2.0     178    7.7    7.1                    55 ER  Note 1/14 RONAK Kirk MD      Labs         X Medication Cipro IV 1/14  Vancomycin 750 mg IV 1/14  Azactam IV 1/15    Lactated ringers IV 1158 1/14  Sodium bicarb 100 meq IV 1/14, 1/15 x2  Sodium bicarb 50 meq IV 1/14    Norepinephrine IV drip 1/14-1/15  Phenylephrine drip IV 1/14  Vasopressin IV 1/14-1/15 Med sheets    Treatment              Other       The noted clinical guidelines are only system guidelines and do not replace the providers clinical judgment.    The National [place] of Neurologic Disorders and Stroke (NINDS) of the NIH describes encephalopathy as any diffuse disease of the brain that alters brain function or structure.    Provider, please specify the diagnosis or diagnoses associated with above clinical findings.  [x   ] Metabolic Encephalopathy - Due to electrolyte imbalance, metabolic derangements, or infectious processes, includes Septic Encephalopathy, Uremic Encephalopathy     [   ] Encephalopathy, unspecified        [   ] Other Encephalopathy (please specify): ____________________     [   ] Other neurological condition- Includes Post-ictal altered mental status (please specify condition): __________   [   ]  Clinically Undetermined     Please document in your progress notes daily for the duration of treatment until resolved, and include in your discharge summary.    References:  CHAPIS Kim RN, CCDS. (2018, June 9). Notes from the Instructor: Encephalopathy tips. Retrieved October 22, 2020, from [email or web address]-[email or web address]    ICD-9-CM Coding Clinic First Quarter 2013, Effective with discharges: October 21, 2013 [facility] § Seizure with encephalopathy due to postictal state (2013).    ICD-10-CM/PCS [employer] Integrated Codebook (Version V.20.8.10.0) [Computer software]. (2020). Retrieved October 21, 2020.    National [place] of Neurological Disorders and Stroke. (2019,  March 27). Retrieved October 22, 2020, from https://www.ninds.nih.gov/Disorders/All-Disorders/Gtsglsbzvzdvgf-Tamdnpugbqc-Hhvm    Form No. 61316

## 2024-08-05 NOTE — PROGRESS NOTES
Pharmacokinetic Initial Assessment: IV Vancomycin    Assessment/Plan:    Initiate intravenous vancomycin with loading dose of 750 mg once.  In setting of NORAH, check random vancomycin level 12 hours after initial dose to determine if scheduling subsequent doses appropriate or if intermittent dosing indicated.  Desired empiric serum trough concentration is 15 to 20 mcg/mL.  Draw vancomycin random level on 01/15/2022 at 1000.  Pharmacy will continue to follow and monitor vancomycin.      Please contact pharmacy at extension [phone] with any questions regarding this assessment.     Thank you for the consult,   Silvia Mccarthy       Patient brief summary:  Marlene Vergara is a 89 y.o. female initiated on antimicrobial therapy with IV Vancomycin for treatment of suspected lower respiratory infection.    Drug Allergies:   Review of patient's allergies indicates:   Allergen Reactions    Pcn [penicillins] Anaphylaxis       Actual Body Weight:   38.6 kg    Renal Function:   Estimated Creatinine Clearance: 10.8 mL/min (A) (based on SCr of 2.1 mg/dL (H)).    CBC (last 72 hours):  Recent Labs   Lab Result Units 01/14/22  1738   WBC K/uL 17.91*   Hemoglobin g/dL 12.0   Hematocrit % 41.6   Platelets K/uL 129*   Gran % % 83.0*   Lymph % % 3.0*   Mono % % 1.0*   Eosinophil % % 0.0   Basophil % % 0.0   Differential Method  Manual       Metabolic Panel (last 72 hours):  Recent Labs   Lab Result Units 01/14/22  1738   Sodium mmol/L 140   Potassium mmol/L 4.4   Chloride mmol/L 107   CO2 mmol/L 12*   Glucose mg/dL 108   BUN mg/dL 37*   Creatinine mg/dL 2.1*   Albumin g/dL 2.3*   Total Bilirubin mg/dL 0.4   Alkaline Phosphatase U/L 139*   AST U/L 158*   ALT U/L 127*       Drug levels (last 3 results):  No results for input(s): VANCOMYCINRA, VANCOMYCINPE, VANCOMYCINTR in the last 72 hours.    Microbiologic Results:  Microbiology Results (last 7 days)     Procedure Component Value Units Date/Time    Blood culture x two cultures. Draw prior  to antibiotics. [[id number]] Collected: 01/14/22 1738    Order Status: Sent Specimen: Blood from Peripheral, Upper Arm, Left Updated: 01/14/22 1829    Blood culture x two cultures. Draw prior to antibiotics. [[id number]] Collected: 01/14/22 1738    Order Status: Sent Specimen: Blood from Peripheral, Hand, Right Updated: 01/14/22 1757    Culture, Respiratory with Gram Stain [[id number]]     Order Status: No result Specimen: Respiratory     Blood culture [[id number]]     Order Status: Canceled Specimen: Blood          No